# Patient Record
Sex: MALE | URBAN - METROPOLITAN AREA
[De-identification: names, ages, dates, MRNs, and addresses within clinical notes are randomized per-mention and may not be internally consistent; named-entity substitution may affect disease eponyms.]

---

## 2018-09-18 ENCOUNTER — APPOINTMENT (OUTPATIENT)
Dept: URBAN - METROPOLITAN AREA CLINIC 236 | Age: 78
Setting detail: DERMATOLOGY
End: 2018-09-18

## 2018-09-18 DIAGNOSIS — L57.0 ACTINIC KERATOSIS: ICD-10-CM

## 2018-09-18 DIAGNOSIS — L82.0 INFLAMED SEBORRHEIC KERATOSIS: ICD-10-CM

## 2018-09-18 PROCEDURE — 17003 DESTRUCT PREMALG LES 2-14: CPT

## 2018-09-18 PROCEDURE — OTHER COUNSELING: OTHER

## 2018-09-18 PROCEDURE — OTHER LIQUID NITROGEN: OTHER

## 2018-09-18 PROCEDURE — 17110 DESTRUCT B9 LESION 1-14: CPT

## 2018-09-18 PROCEDURE — 17000 DESTRUCT PREMALG LESION: CPT | Mod: 59

## 2018-09-18 ASSESSMENT — LOCATION SIMPLE DESCRIPTION DERM
LOCATION SIMPLE: LEFT SCALP
LOCATION SIMPLE: RIGHT TEMPLE
LOCATION SIMPLE: POSTERIOR SCALP
LOCATION SIMPLE: RIGHT CHEEK
LOCATION SIMPLE: RIGHT EYEBROW
LOCATION SIMPLE: LEFT FOREHEAD
LOCATION SIMPLE: LEFT ZYGOMA
LOCATION SIMPLE: RIGHT SCALP
LOCATION SIMPLE: LEFT CHEEK

## 2018-09-18 ASSESSMENT — LOCATION DETAILED DESCRIPTION DERM
LOCATION DETAILED: RIGHT SUPERIOR OCCIPITAL SCALP
LOCATION DETAILED: RIGHT CENTRAL FRONTAL SCALP
LOCATION DETAILED: LEFT SUPERIOR LATERAL MALAR CHEEK
LOCATION DETAILED: LEFT MEDIAL ZYGOMA
LOCATION DETAILED: LEFT MEDIAL MALAR CHEEK
LOCATION DETAILED: RIGHT LATERAL EYEBROW
LOCATION DETAILED: LEFT SUPERIOR FOREHEAD
LOCATION DETAILED: LEFT FOREHEAD
LOCATION DETAILED: LEFT SUPERIOR MEDIAL MALAR CHEEK
LOCATION DETAILED: LEFT CENTRAL FRONTAL SCALP
LOCATION DETAILED: RIGHT CENTRAL TEMPLE
LOCATION DETAILED: LEFT SUPERIOR POSTERIOR PARIETAL SCALP
LOCATION DETAILED: RIGHT CENTRAL MALAR CHEEK

## 2018-09-18 ASSESSMENT — LOCATION ZONE DERM
LOCATION ZONE: FACE
LOCATION ZONE: SCALP

## 2018-09-18 NOTE — PROCEDURE: LIQUID NITROGEN
Detail Level: Detailed
Consent: The patient's consent was obtained including but not limited to risks of crusting, scabbing, blistering, scarring, darker or lighter pigmentary change, recurrence, incomplete removal and infection.
Duration Of Freeze Thaw-Cycle (Seconds): 3
Render Post-Care Instructions In Note?: no
Post-Care Instructions: I reviewed with the patient in detail post-care instructions. Patient is to wear sunprotection, and avoid picking at any of the treated lesions. Pt may apply Vaseline to crusted or scabbing areas.
Number Of Freeze-Thaw Cycles: 3 freeze-thaw cycles
Medical Necessity Information: It is in your best interest to select a reason for this procedure from the list below. All of these items fulfill various CMS LCD requirements except the new and changing color options.
Duration Of Freeze Thaw-Cycle (Seconds): 0
Medical Necessity Clause: This procedure was medically necessary because the lesions that were treated were:

## 2018-10-09 ENCOUNTER — APPOINTMENT (OUTPATIENT)
Dept: URBAN - METROPOLITAN AREA CLINIC 236 | Age: 78
Setting detail: DERMATOLOGY
End: 2018-10-09

## 2018-10-09 DIAGNOSIS — L57.0 ACTINIC KERATOSIS: ICD-10-CM

## 2018-10-09 DIAGNOSIS — L82.0 INFLAMED SEBORRHEIC KERATOSIS: ICD-10-CM

## 2018-10-09 PROCEDURE — 17000 DESTRUCT PREMALG LESION: CPT | Mod: 59

## 2018-10-09 PROCEDURE — 17003 DESTRUCT PREMALG LES 2-14: CPT

## 2018-10-09 PROCEDURE — OTHER LIQUID NITROGEN (CM): OTHER

## 2018-10-09 PROCEDURE — 17110 DESTRUCT B9 LESION 1-14: CPT

## 2018-10-09 PROCEDURE — OTHER LIQUID NITROGEN: OTHER

## 2018-10-09 ASSESSMENT — LOCATION SIMPLE DESCRIPTION DERM
LOCATION SIMPLE: RIGHT FOREHEAD
LOCATION SIMPLE: LEFT ZYGOMA
LOCATION SIMPLE: POSTERIOR SCALP
LOCATION SIMPLE: LEFT TEMPLE
LOCATION SIMPLE: RIGHT THIGH
LOCATION SIMPLE: LEFT EYELID
LOCATION SIMPLE: LEFT CHEEK
LOCATION SIMPLE: LEFT FOREHEAD
LOCATION SIMPLE: RIGHT CHEEK

## 2018-10-09 ASSESSMENT — LOCATION DETAILED DESCRIPTION DERM
LOCATION DETAILED: RIGHT FOREHEAD
LOCATION DETAILED: RIGHT SUPERIOR FOREHEAD
LOCATION DETAILED: LEFT MEDIAL ZYGOMA
LOCATION DETAILED: MID-OCCIPITAL SCALP
LOCATION DETAILED: LEFT INFERIOR LATERAL MALAR CHEEK
LOCATION DETAILED: RIGHT CENTRAL MALAR CHEEK
LOCATION DETAILED: LEFT SUPERIOR FOREHEAD
LOCATION DETAILED: RIGHT ANTERIOR PROXIMAL THIGH
LOCATION DETAILED: LEFT SUPERIOR MEDIAL MALAR CHEEK
LOCATION DETAILED: RIGHT INFERIOR CENTRAL MALAR CHEEK
LOCATION DETAILED: LEFT LATERAL TEMPLE
LOCATION DETAILED: LEFT FOREHEAD
LOCATION DETAILED: LEFT MEDIAL CANTHUS

## 2018-10-09 ASSESSMENT — LOCATION ZONE DERM
LOCATION ZONE: FACE
LOCATION ZONE: LEG
LOCATION ZONE: SCALP
LOCATION ZONE: EYELID

## 2018-10-09 NOTE — PROCEDURE: LIQUID NITROGEN
X Size Of Lesion In Cm (Optional): 0
Include Z78.9 (Other Specified Conditions Influencing Health Status) As An Associated Diagnosis?: Yes
Post-Care Instructions: I reviewed with the patient in detail post-care instructions. Patient is to wear sunprotection, and avoid picking at any of the treated lesions. Pt may apply Vaseline to crusted or scabbing areas.
Render Post-Care Instructions In Note?: no
Number Of Freeze-Thaw Cycles: 3 freeze-thaw cycles
Duration Of Freeze Thaw-Cycle (Seconds): 3
Consent: The patient's consent was obtained including but not limited to risks of crusting, scabbing, blistering, scarring, darker or lighter pigmentary change, recurrence, incomplete removal and infection.
Medical Necessity Clause: This procedure was medically necessary because the lesions that were treated were:
Detail Level: Detailed
Medical Necessity Information: It is in your best interest to select a reason for this procedure from the list below. All of these items fulfill various CMS LCD requirements except the new and changing color options.

## 2018-11-06 ENCOUNTER — APPOINTMENT (OUTPATIENT)
Dept: URBAN - METROPOLITAN AREA CLINIC 236 | Age: 78
Setting detail: DERMATOLOGY
End: 2018-11-07

## 2018-11-06 DIAGNOSIS — L57.0 ACTINIC KERATOSIS: ICD-10-CM

## 2018-11-06 DIAGNOSIS — L82.0 INFLAMED SEBORRHEIC KERATOSIS: ICD-10-CM

## 2018-11-06 PROCEDURE — 17110 DESTRUCT B9 LESION 1-14: CPT | Mod: 59

## 2018-11-06 PROCEDURE — OTHER LIQUID NITROGEN: OTHER

## 2018-11-06 PROCEDURE — 17004 DESTROY PREMAL LESIONS 15/>: CPT

## 2018-11-06 ASSESSMENT — LOCATION ZONE DERM
LOCATION ZONE: NECK
LOCATION ZONE: SCALP
LOCATION ZONE: NOSE
LOCATION ZONE: FACE

## 2018-11-06 ASSESSMENT — LOCATION SIMPLE DESCRIPTION DERM
LOCATION SIMPLE: RIGHT FOREHEAD
LOCATION SIMPLE: RIGHT SCALP
LOCATION SIMPLE: RIGHT CHEEK
LOCATION SIMPLE: LEFT ZYGOMA
LOCATION SIMPLE: LEFT CHEEK
LOCATION SIMPLE: POSTERIOR SCALP
LOCATION SIMPLE: POSTERIOR NECK
LOCATION SIMPLE: NOSE
LOCATION SIMPLE: SCALP
LOCATION SIMPLE: LEFT FOREHEAD

## 2018-11-06 ASSESSMENT — LOCATION DETAILED DESCRIPTION DERM
LOCATION DETAILED: RIGHT CENTRAL MALAR CHEEK
LOCATION DETAILED: RIGHT MEDIAL FOREHEAD
LOCATION DETAILED: LEFT SUPERIOR MEDIAL FOREHEAD
LOCATION DETAILED: RIGHT MEDIAL FRONTAL SCALP
LOCATION DETAILED: MID-OCCIPITAL SCALP
LOCATION DETAILED: RIGHT LATERAL FRONTAL SCALP
LOCATION DETAILED: RIGHT LATERAL TRAPEZIAL NECK
LOCATION DETAILED: LEFT FOREHEAD
LOCATION DETAILED: LEFT CENTRAL ZYGOMA
LOCATION DETAILED: LEFT INFERIOR FOREHEAD
LOCATION DETAILED: RIGHT SUPERIOR PARIETAL SCALP
LOCATION DETAILED: LEFT CENTRAL PARIETAL SCALP
LOCATION DETAILED: NASAL ROOT
LOCATION DETAILED: LEFT SUPERIOR CENTRAL MALAR CHEEK
LOCATION DETAILED: LEFT LATERAL ZYGOMA

## 2018-11-06 NOTE — PROCEDURE: LIQUID NITROGEN
Consent: The patient's consent was obtained including but not limited to risks of crusting, scabbing, blistering, scarring, darker or lighter pigmentary change, recurrence, incomplete removal and infection.
Add 52 Modifier (Optional): no
Number Of Freeze-Thaw Cycles: 3 freeze-thaw cycles
Post-Care Instructions: I reviewed with the patient in detail post-care instructions. Patient is to wear sunprotection, and avoid picking at any of the treated lesions. Pt may apply Vaseline to crusted or scabbing areas.
Medical Necessity Information: It is in your best interest to select a reason for this procedure from the list below. All of these items fulfill various CMS LCD requirements except the new and changing color options.
Detail Level: Simple
Include Z78.9 (Other Specified Conditions Influencing Health Status) As An Associated Diagnosis?: Yes
Duration Of Freeze Thaw-Cycle (Seconds): 3
Medical Necessity Clause: This procedure was medically necessary because the lesions that were treated were:

## 2018-12-04 ENCOUNTER — APPOINTMENT (OUTPATIENT)
Dept: URBAN - METROPOLITAN AREA CLINIC 236 | Age: 78
Setting detail: DERMATOLOGY
End: 2018-12-06

## 2018-12-04 DIAGNOSIS — L57.0 ACTINIC KERATOSIS: ICD-10-CM

## 2018-12-04 DIAGNOSIS — L82.0 INFLAMED SEBORRHEIC KERATOSIS: ICD-10-CM

## 2018-12-04 PROCEDURE — 17004 DESTROY PREMAL LESIONS 15/>: CPT

## 2018-12-04 PROCEDURE — OTHER LIQUID NITROGEN: OTHER

## 2018-12-04 PROCEDURE — OTHER COUNSELING: OTHER

## 2018-12-04 PROCEDURE — 99212 OFFICE O/P EST SF 10 MIN: CPT | Mod: 25

## 2018-12-04 ASSESSMENT — LOCATION DETAILED DESCRIPTION DERM
LOCATION DETAILED: LEFT FOREHEAD
LOCATION DETAILED: LEFT SUPERIOR CENTRAL MALAR CHEEK
LOCATION DETAILED: RIGHT INFERIOR CENTRAL MALAR CHEEK
LOCATION DETAILED: LEFT CENTRAL MALAR CHEEK
LOCATION DETAILED: RIGHT LATERAL FOREHEAD
LOCATION DETAILED: LEFT SUPERIOR MEDIAL MALAR CHEEK
LOCATION DETAILED: LEFT INFERIOR POSTAURICULAR SKIN
LOCATION DETAILED: RIGHT INFERIOR PREAURICULAR CHEEK
LOCATION DETAILED: RIGHT LATERAL TRAPEZIAL NECK
LOCATION DETAILED: RIGHT CENTRAL FRONTAL SCALP
LOCATION DETAILED: LEFT SUPERIOR OCCIPITAL SCALP
LOCATION DETAILED: LEFT SUPERIOR PREAURICULAR CHEEK
LOCATION DETAILED: POSTERIOR MID-PARIETAL SCALP
LOCATION DETAILED: RIGHT INFERIOR LATERAL FOREHEAD
LOCATION DETAILED: RIGHT SUPERIOR OCCIPITAL SCALP

## 2018-12-04 ASSESSMENT — LOCATION SIMPLE DESCRIPTION DERM
LOCATION SIMPLE: LEFT FOREHEAD
LOCATION SIMPLE: RIGHT SCALP
LOCATION SIMPLE: RIGHT FOREHEAD
LOCATION SIMPLE: LEFT CHEEK
LOCATION SIMPLE: POSTERIOR SCALP
LOCATION SIMPLE: RIGHT CHEEK
LOCATION SIMPLE: POSTERIOR NECK

## 2018-12-04 ASSESSMENT — LOCATION ZONE DERM
LOCATION ZONE: SCALP
LOCATION ZONE: FACE
LOCATION ZONE: NECK

## 2018-12-04 NOTE — PROCEDURE: LIQUID NITROGEN
Number Of Freeze-Thaw Cycles: 3 freeze-thaw cycles
Render Post-Care Instructions In Note?: no
Post-Care Instructions: I reviewed with the patient in detail post-care instructions. Patient is to wear sunprotection, and avoid picking at any of the treated lesions. Pt may apply Vaseline to crusted or scabbing areas.
Detail Level: Detailed
Duration Of Freeze Thaw-Cycle (Seconds): 3
Consent: The patient's consent was obtained including but not limited to risks of crusting, scabbing, blistering, scarring, darker or lighter pigmentary change, recurrence, incomplete removal and infection.

## 2019-01-02 ENCOUNTER — APPOINTMENT (OUTPATIENT)
Dept: URBAN - METROPOLITAN AREA CLINIC 236 | Age: 79
Setting detail: DERMATOLOGY
End: 2019-01-02

## 2019-01-02 DIAGNOSIS — L57.0 ACTINIC KERATOSIS: ICD-10-CM

## 2019-01-02 PROCEDURE — OTHER LIQUID NITROGEN: OTHER

## 2019-01-02 PROCEDURE — 17003 DESTRUCT PREMALG LES 2-14: CPT

## 2019-01-02 PROCEDURE — 17000 DESTRUCT PREMALG LESION: CPT

## 2019-01-02 ASSESSMENT — LOCATION ZONE DERM
LOCATION ZONE: FACE
LOCATION ZONE: NOSE
LOCATION ZONE: SCALP
LOCATION ZONE: EAR

## 2019-01-02 ASSESSMENT — LOCATION DETAILED DESCRIPTION DERM
LOCATION DETAILED: RIGHT SUPERIOR FOREHEAD
LOCATION DETAILED: RIGHT INFERIOR CENTRAL MALAR CHEEK
LOCATION DETAILED: LEFT SUPERIOR CRUS OF ANTIHELIX
LOCATION DETAILED: LEFT CENTRAL MALAR CHEEK
LOCATION DETAILED: LEFT NASAL SIDEWALL
LOCATION DETAILED: LEFT SUPERIOR CENTRAL MALAR CHEEK
LOCATION DETAILED: LEFT CENTRAL FRONTAL SCALP

## 2019-01-02 ASSESSMENT — LOCATION SIMPLE DESCRIPTION DERM
LOCATION SIMPLE: LEFT CHEEK
LOCATION SIMPLE: LEFT EAR
LOCATION SIMPLE: LEFT SCALP
LOCATION SIMPLE: RIGHT CHEEK
LOCATION SIMPLE: RIGHT FOREHEAD
LOCATION SIMPLE: LEFT NOSE

## 2019-01-02 NOTE — PROCEDURE: LIQUID NITROGEN
Detail Level: Detailed
Duration Of Freeze Thaw-Cycle (Seconds): 3
Consent: The patient's consent was obtained including but not limited to risks of crusting, scabbing, blistering, scarring, darker or lighter pigmentary change, recurrence, incomplete removal and infection.
Render Post-Care Instructions In Note?: no
Number Of Freeze-Thaw Cycles: 3 freeze-thaw cycles
Post-Care Instructions: I reviewed with the patient in detail post-care instructions. Patient is to wear sunprotection, and avoid picking at any of the treated lesions. Pt may apply Vaseline to crusted or scabbing areas.

## 2019-01-30 ENCOUNTER — APPOINTMENT (OUTPATIENT)
Dept: URBAN - METROPOLITAN AREA CLINIC 236 | Age: 79
Setting detail: DERMATOLOGY
End: 2019-01-30

## 2019-01-30 DIAGNOSIS — D49.2 NEOPLASM OF UNSPECIFIED BEHAVIOR OF BONE, SOFT TISSUE, AND SKIN: ICD-10-CM

## 2019-01-30 DIAGNOSIS — L57.0 ACTINIC KERATOSIS: ICD-10-CM

## 2019-01-30 PROCEDURE — OTHER SHAVE REMOVAL: OTHER

## 2019-01-30 PROCEDURE — 17000 DESTRUCT PREMALG LESION: CPT | Mod: 59

## 2019-01-30 PROCEDURE — 17003 DESTRUCT PREMALG LES 2-14: CPT

## 2019-01-30 PROCEDURE — 11102 TANGNTL BX SKIN SINGLE LES: CPT | Mod: 59

## 2019-01-30 PROCEDURE — OTHER LIQUID NITROGEN: OTHER

## 2019-01-30 PROCEDURE — 11310 SHAVE SKIN LESION 0.5 CM/<: CPT | Mod: 59

## 2019-01-30 PROCEDURE — OTHER BIOPSY BY SHAVE METHOD: OTHER

## 2019-01-30 ASSESSMENT — LOCATION DETAILED DESCRIPTION DERM
LOCATION DETAILED: LEFT FOREHEAD
LOCATION DETAILED: RIGHT SUPERIOR OCCIPITAL SCALP
LOCATION DETAILED: LEFT CENTRAL MALAR CHEEK
LOCATION DETAILED: LEFT NASAL SIDEWALL
LOCATION DETAILED: MID-OCCIPITAL SCALP
LOCATION DETAILED: RIGHT INFERIOR CENTRAL MALAR CHEEK
LOCATION DETAILED: LEFT SUPERIOR OCCIPITAL SCALP
LOCATION DETAILED: LEFT CENTRAL FRONTAL SCALP
LOCATION DETAILED: RIGHT SUPERIOR FOREHEAD
LOCATION DETAILED: LEFT SUPERIOR MEDIAL MALAR CHEEK
LOCATION DETAILED: LEFT SUPERIOR CENTRAL MALAR CHEEK

## 2019-01-30 ASSESSMENT — LOCATION SIMPLE DESCRIPTION DERM
LOCATION SIMPLE: LEFT FOREHEAD
LOCATION SIMPLE: LEFT NOSE
LOCATION SIMPLE: LEFT SCALP
LOCATION SIMPLE: POSTERIOR SCALP
LOCATION SIMPLE: RIGHT CHEEK
LOCATION SIMPLE: LEFT CHEEK
LOCATION SIMPLE: RIGHT FOREHEAD

## 2019-01-30 ASSESSMENT — LOCATION ZONE DERM
LOCATION ZONE: NOSE
LOCATION ZONE: FACE
LOCATION ZONE: SCALP

## 2019-01-30 NOTE — PROCEDURE: SHAVE REMOVAL
Was A Bandage Applied: Yes
Detail Level: Detailed
Wound Care: Vaseline
Bill 68535 For Specimen Handling/Conveyance To Laboratory?: no
Notification Instructions: Patient will be notified of biopsy results. However, patient instructed to call the office if not contacted within 2 weeks.
Medical Necessity Clause: This procedure was medically necessary because the lesion that was treated was:
Size Of Lesion In Cm (Required): 0.2
X Size Of Lesion In Cm (Optional): 0
Biopsy Method: Dermablade
Hemostasis: Electrocautery and TCA 25%
Post-Care Instructions: I reviewed with the patient in detail post-care instructions. Patient is to keep the biopsy site dry overnight, and then apply bacitracin twice daily until healed. Patient may apply hydrogen peroxide soaks to remove any crusting.
Medical Necessity Information: It is in your best interest to select a reason for this procedure from the list below. All of these items fulfill various CMS LCD requirements except the new and changing color options.
Path Notes (To The Dermatopathologist): Please check margins.
Billing Type: Third-Party Bill
Anesthesia Type: 1% lidocaine with epinephrine
Consent was obtained from the patient. The risks and benefits to therapy were discussed in detail. Specifically, the risks of infection, scarring, bleeding, prolonged wound healing, incomplete removal, allergy to anesthesia, nerve injury and recurrence were addressed. Prior to the procedure, the treatment site was clearly identified and confirmed by the patient. All components of Universal Protocol/PAUSE Rule completed.

## 2019-01-30 NOTE — PROCEDURE: BIOPSY BY SHAVE METHOD
X Size Of Lesion In Cm: 0
Post-Care Instructions: I reviewed with the patient in detail post-care instructions. Patient is to keep the biopsy site dry overnight, and then apply bacitracin twice daily until healed. Patient may apply hydrogen peroxide soaks to remove any crusting.
Destruction After The Procedure: No
Electrodesiccation Text: The wound bed was treated with electrodesiccation after the biopsy was performed.
Anesthesia Type: 1% lidocaine with epinephrine
Notification Instructions: Patient will be notified of biopsy results. However, patient instructed to call the office if not contacted within 2 weeks.
Consent: Written consent was obtained and risks were reviewed including but not limited to scarring, infection, bleeding, scabbing, incomplete removal, nerve damage and allergy to anesthesia.
Size Of Lesion In Cm: 0.7
Electrodesiccation And Curettage Text: The wound bed was treated with electrodesiccation and curettage after the biopsy was performed.
Depth Of Biopsy: dermis
Biopsy Method: Dermablade
Type Of Destruction Used: Curettage
Billing Type: Third-Party Bill
Anesthesia Volume In Cc (Will Not Render If 0): 0.5
Was A Bandage Applied: Yes
Cryotherapy Text: The wound bed was treated with cryotherapy after the biopsy was performed.
Detail Level: Detailed
Silver Nitrate Text: The wound bed was treated with silver nitrate after the biopsy was performed.
Wound Care: Bacitracin
Biopsy Type: H and E
Hemostasis: Electrocautery and TCA 25%
Dressing: bandage
Curettage Text: The wound bed was treated with curettage after the biopsy was performed.

## 2019-02-27 ENCOUNTER — APPOINTMENT (OUTPATIENT)
Dept: URBAN - METROPOLITAN AREA CLINIC 236 | Age: 79
Setting detail: DERMATOLOGY
End: 2019-02-27

## 2019-02-27 DIAGNOSIS — L57.0 ACTINIC KERATOSIS: ICD-10-CM

## 2019-02-27 PROBLEM — C44.329 SQUAMOUS CELL CARCINOMA OF SKIN OF OTHER PARTS OF FACE: Status: ACTIVE | Noted: 2019-02-27

## 2019-02-27 PROCEDURE — 17004 DESTROY PREMAL LESIONS 15/>: CPT

## 2019-02-27 PROCEDURE — OTHER DEFER: OTHER

## 2019-02-27 PROCEDURE — 99212 OFFICE O/P EST SF 10 MIN: CPT | Mod: 25

## 2019-02-27 PROCEDURE — OTHER LIQUID NITROGEN: OTHER

## 2019-02-27 PROCEDURE — OTHER PATHOLOGY DISCUSSION: OTHER

## 2019-02-27 ASSESSMENT — LOCATION DETAILED DESCRIPTION DERM
LOCATION DETAILED: MID-OCCIPITAL SCALP
LOCATION DETAILED: LEFT INFERIOR CENTRAL MALAR CHEEK
LOCATION DETAILED: RIGHT INFERIOR FOREHEAD
LOCATION DETAILED: RIGHT MEDIAL ZYGOMA
LOCATION DETAILED: RIGHT MID PREAURICULAR CHEEK
LOCATION DETAILED: LEFT SUPERIOR CENTRAL MALAR CHEEK
LOCATION DETAILED: LEFT SUPERIOR PREAURICULAR CHEEK
LOCATION DETAILED: LEFT INFERIOR LATERAL MALAR CHEEK
LOCATION DETAILED: LEFT INFERIOR MEDIAL FOREHEAD
LOCATION DETAILED: LEFT FOREHEAD
LOCATION DETAILED: LEFT INFERIOR FOREHEAD
LOCATION DETAILED: LEFT SUPERIOR LATERAL MALAR CHEEK
LOCATION DETAILED: RIGHT SUPERIOR FOREHEAD
LOCATION DETAILED: RIGHT SUPERIOR OCCIPITAL SCALP
LOCATION DETAILED: LEFT SUPERIOR MEDIAL FOREHEAD

## 2019-02-27 ASSESSMENT — LOCATION SIMPLE DESCRIPTION DERM
LOCATION SIMPLE: LEFT FOREHEAD
LOCATION SIMPLE: RIGHT CHEEK
LOCATION SIMPLE: POSTERIOR SCALP
LOCATION SIMPLE: LEFT CHEEK
LOCATION SIMPLE: RIGHT FOREHEAD
LOCATION SIMPLE: RIGHT ZYGOMA

## 2019-02-27 ASSESSMENT — LOCATION ZONE DERM
LOCATION ZONE: FACE
LOCATION ZONE: SCALP

## 2019-02-27 NOTE — PROCEDURE: LIQUID NITROGEN
Post-Care Instructions: I reviewed with the patient in detail post-care instructions. Patient is to wear sunprotection, and avoid picking at any of the treated lesions. Pt may apply Vaseline to crusted or scabbing areas.
Number Of Freeze-Thaw Cycles: 3 freeze-thaw cycles
Detail Level: Detailed
Consent: The patient's consent was obtained including but not limited to risks of crusting, scabbing, blistering, scarring, darker or lighter pigmentary change, recurrence, incomplete removal and infection.
Duration Of Freeze Thaw-Cycle (Seconds): 3
Render Post-Care Instructions In Note?: no

## 2019-02-27 NOTE — PROCEDURE: DEFER
Introduction Text (Please End With A Colon): The following procedure was deferred: Dr. Ge
Procedure To Be Performed At Next Visit: Mohs surgery
Detail Level: Detailed

## 2019-03-27 ENCOUNTER — APPOINTMENT (OUTPATIENT)
Dept: URBAN - METROPOLITAN AREA CLINIC 236 | Age: 79
Setting detail: DERMATOLOGY
End: 2019-03-27

## 2019-03-27 DIAGNOSIS — L57.0 ACTINIC KERATOSIS: ICD-10-CM

## 2019-03-27 DIAGNOSIS — Z85.828 PERSONAL HISTORY OF OTHER MALIGNANT NEOPLASM OF SKIN: ICD-10-CM

## 2019-03-27 PROCEDURE — 17003 DESTRUCT PREMALG LES 2-14: CPT

## 2019-03-27 PROCEDURE — 17000 DESTRUCT PREMALG LESION: CPT

## 2019-03-27 PROCEDURE — 99212 OFFICE O/P EST SF 10 MIN: CPT | Mod: 25

## 2019-03-27 PROCEDURE — OTHER DIAGNOSIS COMMENT: OTHER

## 2019-03-27 PROCEDURE — OTHER POST-OP WOUND EVALUATION: OTHER

## 2019-03-27 PROCEDURE — OTHER LIQUID NITROGEN: OTHER

## 2019-03-27 ASSESSMENT — LOCATION DETAILED DESCRIPTION DERM
LOCATION DETAILED: LEFT SUPERIOR CENTRAL MALAR CHEEK
LOCATION DETAILED: LEFT SUPERIOR MEDIAL MALAR CHEEK
LOCATION DETAILED: RIGHT SUPERIOR OCCIPITAL SCALP
LOCATION DETAILED: RIGHT CENTRAL MALAR CHEEK
LOCATION DETAILED: LEFT INFERIOR LATERAL FOREHEAD
LOCATION DETAILED: RIGHT INFERIOR FOREHEAD
LOCATION DETAILED: MID-OCCIPITAL SCALP
LOCATION DETAILED: RIGHT MEDIAL ZYGOMA
LOCATION DETAILED: LEFT SUPERIOR PREAURICULAR CHEEK
LOCATION DETAILED: LEFT SUPERIOR OCCIPITAL SCALP

## 2019-03-27 ASSESSMENT — LOCATION ZONE DERM
LOCATION ZONE: SCALP
LOCATION ZONE: FACE

## 2019-03-27 ASSESSMENT — LOCATION SIMPLE DESCRIPTION DERM
LOCATION SIMPLE: RIGHT ZYGOMA
LOCATION SIMPLE: POSTERIOR SCALP
LOCATION SIMPLE: LEFT FOREHEAD
LOCATION SIMPLE: RIGHT FOREHEAD
LOCATION SIMPLE: LEFT CHEEK
LOCATION SIMPLE: RIGHT CHEEK

## 2019-03-27 NOTE — PROCEDURE: LIQUID NITROGEN
Detail Level: Detailed
Number Of Freeze-Thaw Cycles: 3 freeze-thaw cycles
Duration Of Freeze Thaw-Cycle (Seconds): 3
Consent: The patient's consent was obtained including but not limited to risks of crusting, scabbing, blistering, scarring, darker or lighter pigmentary change, recurrence, incomplete removal and infection.
Render Post-Care Instructions In Note?: no
Post-Care Instructions: I reviewed with the patient in detail post-care instructions. Patient is to wear sunprotection, and avoid picking at any of the treated lesions. Pt may apply Vaseline to crusted or scabbing areas.

## 2019-04-25 ENCOUNTER — APPOINTMENT (OUTPATIENT)
Dept: URBAN - METROPOLITAN AREA CLINIC 236 | Age: 79
Setting detail: DERMATOLOGY
End: 2019-04-25

## 2019-04-25 DIAGNOSIS — L57.0 ACTINIC KERATOSIS: ICD-10-CM

## 2019-04-25 PROCEDURE — OTHER LIQUID NITROGEN: OTHER

## 2019-04-25 PROCEDURE — OTHER ADDITIONAL NOTES: OTHER

## 2019-04-25 PROCEDURE — 17004 DESTROY PREMAL LESIONS 15/>: CPT

## 2019-04-25 ASSESSMENT — LOCATION DETAILED DESCRIPTION DERM
LOCATION DETAILED: RIGHT SUPERIOR FOREHEAD
LOCATION DETAILED: RIGHT LATERAL FOREHEAD
LOCATION DETAILED: LEFT POSTERIOR PARIETAL SCALP
LOCATION DETAILED: LEFT SUPERIOR MEDIAL FOREHEAD
LOCATION DETAILED: RIGHT RADIAL DORSAL HAND
LOCATION DETAILED: LEFT SUPERIOR OCCIPITAL SCALP
LOCATION DETAILED: LEFT MEDIAL FRONTAL SCALP
LOCATION DETAILED: LEFT SUPERIOR PREAURICULAR CHEEK
LOCATION DETAILED: MID-OCCIPITAL SCALP
LOCATION DETAILED: LEFT SUPERIOR CENTRAL MALAR CHEEK
LOCATION DETAILED: RIGHT MEDIAL FRONTAL SCALP
LOCATION DETAILED: RIGHT CENTRAL ZYGOMA
LOCATION DETAILED: RIGHT SUPERIOR OCCIPITAL SCALP
LOCATION DETAILED: LEFT INFERIOR CRUS OF ANTIHELIX
LOCATION DETAILED: LEFT CENTRAL ZYGOMA
LOCATION DETAILED: LEFT INFERIOR CENTRAL MALAR CHEEK
LOCATION DETAILED: LEFT FOREHEAD

## 2019-04-25 ASSESSMENT — LOCATION SIMPLE DESCRIPTION DERM
LOCATION SIMPLE: RIGHT OCCIPITAL SCALP
LOCATION SIMPLE: LEFT FOREHEAD
LOCATION SIMPLE: RIGHT HAND
LOCATION SIMPLE: LEFT SCALP
LOCATION SIMPLE: POSTERIOR SCALP
LOCATION SIMPLE: LEFT OCCIPITAL SCALP
LOCATION SIMPLE: RIGHT SCALP
LOCATION SIMPLE: LEFT EAR
LOCATION SIMPLE: RIGHT ZYGOMA
LOCATION SIMPLE: LEFT ZYGOMA
LOCATION SIMPLE: LEFT CHEEK
LOCATION SIMPLE: RIGHT FOREHEAD

## 2019-04-25 ASSESSMENT — LOCATION ZONE DERM
LOCATION ZONE: EAR
LOCATION ZONE: SCALP
LOCATION ZONE: FACE
LOCATION ZONE: HAND

## 2019-04-25 NOTE — PROCEDURE: LIQUID NITROGEN
Number Of Freeze-Thaw Cycles: 3 freeze-thaw cycles
Post-Care Instructions: I reviewed with the patient in detail post-care instructions. Patient is to wear sunprotection, and avoid picking at any of the treated lesions. Pt may apply Vaseline to crusted or scabbing areas.
Render Note In Bullet Format When Appropriate: No
Duration Of Freeze Thaw-Cycle (Seconds): 3
Consent: The patient's consent was obtained including but not limited to risks of crusting, scabbing, blistering, scarring, darker or lighter pigmentary change, recurrence, incomplete removal and infection.
Detail Level: Detailed

## 2019-04-25 NOTE — PROCEDURE: ADDITIONAL NOTES
Additional Notes: The AK That has been treated under the eye will be bx at next visit if not reactive to ln2
Detail Level: Simple

## 2019-05-23 ENCOUNTER — APPOINTMENT (OUTPATIENT)
Dept: URBAN - METROPOLITAN AREA CLINIC 236 | Age: 79
Setting detail: DERMATOLOGY
End: 2019-05-23

## 2019-05-23 DIAGNOSIS — L57.0 ACTINIC KERATOSIS: ICD-10-CM

## 2019-05-23 PROCEDURE — 17004 DESTROY PREMAL LESIONS 15/>: CPT

## 2019-05-23 PROCEDURE — OTHER LIQUID NITROGEN: OTHER

## 2019-05-23 ASSESSMENT — LOCATION DETAILED DESCRIPTION DERM
LOCATION DETAILED: RIGHT SUPERIOR OCCIPITAL SCALP
LOCATION DETAILED: RIGHT CENTRAL FRONTAL SCALP
LOCATION DETAILED: GLABELLA
LOCATION DETAILED: RIGHT SUPERIOR FRONTAL SCALP
LOCATION DETAILED: RIGHT INFERIOR LATERAL FOREHEAD
LOCATION DETAILED: LEFT LATERAL FOREHEAD
LOCATION DETAILED: LEFT MEDIAL ZYGOMA
LOCATION DETAILED: RIGHT MID PREAURICULAR CHEEK
LOCATION DETAILED: NASAL DORSUM
LOCATION DETAILED: RIGHT CENTRAL MALAR CHEEK
LOCATION DETAILED: LEFT FOREHEAD
LOCATION DETAILED: LEFT SUPERIOR FOREHEAD
LOCATION DETAILED: RIGHT SUPERIOR CENTRAL MALAR CHEEK
LOCATION DETAILED: MID-OCCIPITAL SCALP
LOCATION DETAILED: LEFT SUPERIOR CENTRAL MALAR CHEEK
LOCATION DETAILED: LEFT INFERIOR FOREHEAD
LOCATION DETAILED: LEFT ANTIHELIX
LOCATION DETAILED: LEFT INFERIOR CENTRAL MALAR CHEEK

## 2019-05-23 ASSESSMENT — LOCATION SIMPLE DESCRIPTION DERM
LOCATION SIMPLE: RIGHT SCALP
LOCATION SIMPLE: GLABELLA
LOCATION SIMPLE: NOSE
LOCATION SIMPLE: RIGHT FOREHEAD
LOCATION SIMPLE: LEFT FOREHEAD
LOCATION SIMPLE: POSTERIOR SCALP
LOCATION SIMPLE: LEFT ZYGOMA
LOCATION SIMPLE: SCALP
LOCATION SIMPLE: LEFT CHEEK
LOCATION SIMPLE: LEFT EAR
LOCATION SIMPLE: RIGHT CHEEK

## 2019-05-23 ASSESSMENT — LOCATION ZONE DERM
LOCATION ZONE: EAR
LOCATION ZONE: SCALP
LOCATION ZONE: NOSE
LOCATION ZONE: FACE

## 2019-05-23 NOTE — PROCEDURE: LIQUID NITROGEN
Render Note In Bullet Format When Appropriate: No
Detail Level: Detailed
Duration Of Freeze Thaw-Cycle (Seconds): 3
Post-Care Instructions: I reviewed with the patient in detail post-care instructions. Patient is to wear sunprotection, and avoid picking at any of the treated lesions. Pt may apply Vaseline to crusted or scabbing areas.
Consent: The patient's consent was obtained including but not limited to risks of crusting, scabbing, blistering, scarring, darker or lighter pigmentary change, recurrence, incomplete removal and infection.
Number Of Freeze-Thaw Cycles: 3 freeze-thaw cycles

## 2019-06-20 ENCOUNTER — APPOINTMENT (OUTPATIENT)
Dept: URBAN - METROPOLITAN AREA CLINIC 236 | Age: 79
Setting detail: DERMATOLOGY
End: 2019-06-21

## 2019-06-20 DIAGNOSIS — L57.0 ACTINIC KERATOSIS: ICD-10-CM

## 2019-06-20 DIAGNOSIS — L89 PRESSURE ULCER: ICD-10-CM

## 2019-06-20 PROBLEM — L89.152 PRESSURE ULCER OF SACRAL REGION, STAGE 2: Status: ACTIVE | Noted: 2019-06-20

## 2019-06-20 PROCEDURE — OTHER PRESCRIPTION: OTHER

## 2019-06-20 PROCEDURE — 17000 DESTRUCT PREMALG LESION: CPT

## 2019-06-20 PROCEDURE — OTHER LIQUID NITROGEN: OTHER

## 2019-06-20 PROCEDURE — OTHER ADDITIONAL NOTES: OTHER

## 2019-06-20 PROCEDURE — 17003 DESTRUCT PREMALG LES 2-14: CPT

## 2019-06-20 PROCEDURE — OTHER ORDER TESTS: OTHER

## 2019-06-20 PROCEDURE — OTHER COUNSELING: OTHER

## 2019-06-20 PROCEDURE — 99213 OFFICE O/P EST LOW 20 MIN: CPT | Mod: 25

## 2019-06-20 RX ORDER — MUPIROCIN 20 MG/G
OINTMENT TOPICAL
Qty: 1 | Refills: 0 | Status: ERX | COMMUNITY
Start: 2019-06-20

## 2019-06-20 ASSESSMENT — LOCATION SIMPLE DESCRIPTION DERM
LOCATION SIMPLE: GLUTEAL CLEFT
LOCATION SIMPLE: RIGHT FOREHEAD
LOCATION SIMPLE: RIGHT SCALP
LOCATION SIMPLE: LEFT EYEBROW
LOCATION SIMPLE: LEFT FOREHEAD
LOCATION SIMPLE: LEFT CHEEK
LOCATION SIMPLE: SCALP

## 2019-06-20 ASSESSMENT — LOCATION DETAILED DESCRIPTION DERM
LOCATION DETAILED: RIGHT CENTRAL FRONTAL SCALP
LOCATION DETAILED: LEFT LATERAL EYEBROW
LOCATION DETAILED: LEFT SUPERIOR LATERAL MALAR CHEEK
LOCATION DETAILED: GLUTEAL CLEFT
LOCATION DETAILED: RIGHT FOREHEAD
LOCATION DETAILED: LEFT SUPERIOR FOREHEAD
LOCATION DETAILED: LEFT MEDIAL EYEBROW
LOCATION DETAILED: RIGHT SUPERIOR FOREHEAD
LOCATION DETAILED: LEFT SUPERIOR MEDIAL MALAR CHEEK
LOCATION DETAILED: LEFT INFERIOR POSTAURICULAR SKIN

## 2019-06-20 ASSESSMENT — LOCATION ZONE DERM
LOCATION ZONE: SCALP
LOCATION ZONE: TRUNK
LOCATION ZONE: FACE

## 2019-06-20 NOTE — PROCEDURE: ADDITIONAL NOTES
Additional Notes: Instructs pt to get a “donut” to sit on to help relieve pressure to area.
Detail Level: Simple

## 2019-06-20 NOTE — PROCEDURE: LIQUID NITROGEN
Post-Care Instructions: I reviewed with the patient in detail post-care instructions. Patient is to wear sunprotection, and avoid picking at any of the treated lesions. Pt may apply Vaseline to crusted or scabbing areas.
Number Of Freeze-Thaw Cycles: 3 freeze-thaw cycles
Consent: The patient's consent was obtained including but not limited to risks of crusting, scabbing, blistering, scarring, darker or lighter pigmentary change, recurrence, incomplete removal and infection.
Render Post-Care Instructions In Note?: no
Duration Of Freeze Thaw-Cycle (Seconds): 3
Detail Level: Detailed

## 2019-06-27 ENCOUNTER — APPOINTMENT (OUTPATIENT)
Dept: URBAN - METROPOLITAN AREA CLINIC 236 | Age: 79
Setting detail: DERMATOLOGY
End: 2019-06-27

## 2019-06-27 DIAGNOSIS — L89 PRESSURE ULCER: ICD-10-CM

## 2019-06-27 PROBLEM — L89.151 PRESSURE ULCER OF SACRAL REGION, STAGE 1: Status: ACTIVE | Noted: 2019-06-27

## 2019-06-27 PROCEDURE — OTHER PRESCRIPTION: OTHER

## 2019-06-27 PROCEDURE — 99213 OFFICE O/P EST LOW 20 MIN: CPT | Mod: 24

## 2019-06-27 PROCEDURE — OTHER COUNSELING: OTHER

## 2019-06-27 PROCEDURE — OTHER ADDITIONAL NOTES: OTHER

## 2019-06-27 RX ORDER — MUPIROCIN 20 MG/G
OINTMENT TOPICAL
Qty: 1 | Refills: 0 | Status: ERX

## 2019-06-27 ASSESSMENT — LOCATION ZONE DERM: LOCATION ZONE: TRUNK

## 2019-06-27 ASSESSMENT — LOCATION DETAILED DESCRIPTION DERM: LOCATION DETAILED: GLUTEAL CLEFT

## 2019-06-27 ASSESSMENT — LOCATION SIMPLE DESCRIPTION DERM: LOCATION SIMPLE: GLUTEAL CLEFT

## 2019-06-27 NOTE — PROCEDURE: ADDITIONAL NOTES
Detail Level: Simple
Additional Notes: Instructs pt to continue to use a “donut” to sit on to help relieve pressure to area.

## 2019-07-09 ENCOUNTER — APPOINTMENT (OUTPATIENT)
Dept: URBAN - METROPOLITAN AREA CLINIC 236 | Age: 79
Setting detail: DERMATOLOGY
End: 2019-07-10

## 2019-07-09 DIAGNOSIS — D49.2 NEOPLASM OF UNSPECIFIED BEHAVIOR OF BONE, SOFT TISSUE, AND SKIN: ICD-10-CM

## 2019-07-09 DIAGNOSIS — L89 PRESSURE ULCER: ICD-10-CM

## 2019-07-09 PROBLEM — L89.151 PRESSURE ULCER OF SACRAL REGION, STAGE 1: Status: ACTIVE | Noted: 2019-07-09

## 2019-07-09 PROCEDURE — OTHER ADDITIONAL NOTES: OTHER

## 2019-07-09 PROCEDURE — OTHER COUNSELING: OTHER

## 2019-07-09 PROCEDURE — 99213 OFFICE O/P EST LOW 20 MIN: CPT | Mod: 25

## 2019-07-09 PROCEDURE — A4550 SURGICAL TRAYS: HCPCS

## 2019-07-09 PROCEDURE — OTHER BIOPSY BY SHAVE METHOD: OTHER

## 2019-07-09 PROCEDURE — 11102 TANGNTL BX SKIN SINGLE LES: CPT

## 2019-07-09 ASSESSMENT — LOCATION SIMPLE DESCRIPTION DERM
LOCATION SIMPLE: NOSE
LOCATION SIMPLE: GLUTEAL CLEFT

## 2019-07-09 ASSESSMENT — LOCATION DETAILED DESCRIPTION DERM
LOCATION DETAILED: LEFT NASAL ROOT
LOCATION DETAILED: GLUTEAL CLEFT

## 2019-07-09 ASSESSMENT — LOCATION ZONE DERM
LOCATION ZONE: NOSE
LOCATION ZONE: TRUNK

## 2019-07-09 NOTE — PROCEDURE: BIOPSY BY SHAVE METHOD
Destruction After The Procedure: No
Type Of Destruction Used: Curettage
Anesthesia Type: 1% lidocaine with epinephrine
Path Notes (To The Dermatopathologist): Please check margins
Notification Instructions: Patient will be notified of biopsy results. However, patient instructed to call the office if not contacted within 2 weeks.
Silver Nitrate Text: The wound bed was treated with silver nitrate after the biopsy was performed.
Post-Care Instructions: I reviewed with the patient in detail post-care instructions. Patient is to keep the biopsy site dry overnight, and then apply bacitracin twice daily until healed. Patient may apply hydrogen peroxide soaks to remove any crusting.
Dressing: bandage
Biopsy Type: H and E
Additional Anesthesia Volume In Cc (Will Not Render If 0): 0
Curettage Text: The wound bed was treated with curettage after the biopsy was performed.
Electrodesiccation Text: The wound bed was treated with electrodesiccation after the biopsy was performed.
Bill For Surgical Tray: yes
Detail Level: Detailed
Anesthesia Volume In Cc (Will Not Render If 0): 0.5
Hemostasis: Electrocautery and TCA 25%
Cryotherapy Text: The wound bed was treated with cryotherapy after the biopsy was performed.
Depth Of Biopsy: dermis
Consent: Written consent was obtained and risks were reviewed including but not limited to scarring, infection, bleeding, scabbing, incomplete removal, nerve damage and allergy to anesthesia.
Biopsy Method: Dermablade
Billing Type: Third-Party Bill
Electrodesiccation And Curettage Text: The wound bed was treated with electrodesiccation and curettage after the biopsy was performed.
Wound Care: Bacitracin

## 2019-07-30 ENCOUNTER — APPOINTMENT (OUTPATIENT)
Dept: URBAN - METROPOLITAN AREA CLINIC 236 | Age: 79
Setting detail: DERMATOLOGY
End: 2019-07-31

## 2019-07-30 DIAGNOSIS — L57.0 ACTINIC KERATOSIS: ICD-10-CM

## 2019-07-30 PROBLEM — D04.39 CARCINOMA IN SITU OF SKIN OF OTHER PARTS OF FACE: Status: ACTIVE | Noted: 2019-07-30

## 2019-07-30 PROCEDURE — OTHER DEFER: OTHER

## 2019-07-30 PROCEDURE — OTHER PATHOLOGY DISCUSSION: OTHER

## 2019-07-30 PROCEDURE — 17003 DESTRUCT PREMALG LES 2-14: CPT

## 2019-07-30 PROCEDURE — 99213 OFFICE O/P EST LOW 20 MIN: CPT | Mod: 25

## 2019-07-30 PROCEDURE — OTHER LIQUID NITROGEN: OTHER

## 2019-07-30 PROCEDURE — 17000 DESTRUCT PREMALG LESION: CPT

## 2019-07-30 ASSESSMENT — LOCATION ZONE DERM
LOCATION ZONE: SCALP
LOCATION ZONE: FACE

## 2019-07-30 ASSESSMENT — LOCATION DETAILED DESCRIPTION DERM
LOCATION DETAILED: LEFT SUPERIOR CENTRAL MALAR CHEEK
LOCATION DETAILED: LEFT CENTRAL EYEBROW
LOCATION DETAILED: RIGHT LATERAL MALAR CHEEK
LOCATION DETAILED: RIGHT CENTRAL FRONTAL SCALP
LOCATION DETAILED: RIGHT MID PREAURICULAR CHEEK
LOCATION DETAILED: RIGHT INFERIOR CENTRAL MALAR CHEEK
LOCATION DETAILED: LEFT SUPERIOR PARIETAL SCALP
LOCATION DETAILED: LEFT SUPERIOR LATERAL MALAR CHEEK

## 2019-07-30 ASSESSMENT — LOCATION SIMPLE DESCRIPTION DERM
LOCATION SIMPLE: LEFT CHEEK
LOCATION SIMPLE: SCALP
LOCATION SIMPLE: LEFT EYEBROW
LOCATION SIMPLE: RIGHT CHEEK
LOCATION SIMPLE: RIGHT SCALP

## 2019-07-30 NOTE — PROCEDURE: DEFER
Introduction Text (Please End With A Colon): The following procedure was deferred:
Scheduling Instructions (Optional): with Dr Ge
Detail Level: Detailed

## 2019-08-21 ENCOUNTER — RX ONLY (RX ONLY)
Age: 79
End: 2019-08-21

## 2019-08-21 RX ORDER — MUPIROCIN 20 MG/G
OINTMENT TOPICAL
Qty: 1 | Refills: 0 | Status: ERX

## 2019-08-27 ENCOUNTER — APPOINTMENT (OUTPATIENT)
Dept: URBAN - METROPOLITAN AREA CLINIC 236 | Age: 79
Setting detail: DERMATOLOGY
End: 2019-08-27

## 2019-08-27 DIAGNOSIS — L57.0 ACTINIC KERATOSIS: ICD-10-CM

## 2019-08-27 DIAGNOSIS — Z85.828 PERSONAL HISTORY OF OTHER MALIGNANT NEOPLASM OF SKIN: ICD-10-CM

## 2019-08-27 PROCEDURE — OTHER POST-OP WOUND EVALUATION: OTHER

## 2019-08-27 PROCEDURE — OTHER OBSERVATION: OTHER

## 2019-08-27 PROCEDURE — OTHER LIQUID NITROGEN: OTHER

## 2019-08-27 PROCEDURE — 17004 DESTROY PREMAL LESIONS 15/>: CPT

## 2019-08-27 PROCEDURE — 99213 OFFICE O/P EST LOW 20 MIN: CPT | Mod: 25

## 2019-08-27 ASSESSMENT — LOCATION SIMPLE DESCRIPTION DERM
LOCATION SIMPLE: RIGHT EYEBROW
LOCATION SIMPLE: LEFT FOREHEAD
LOCATION SIMPLE: NOSE
LOCATION SIMPLE: RIGHT FOREHEAD
LOCATION SIMPLE: RIGHT OCCIPITAL SCALP
LOCATION SIMPLE: RIGHT CHEEK
LOCATION SIMPLE: RIGHT ZYGOMA
LOCATION SIMPLE: LEFT CHEEK

## 2019-08-27 ASSESSMENT — LOCATION DETAILED DESCRIPTION DERM
LOCATION DETAILED: RIGHT FOREHEAD
LOCATION DETAILED: RIGHT SUPERIOR OCCIPITAL SCALP
LOCATION DETAILED: RIGHT EYEBROW
LOCATION DETAILED: RIGHT SUPERIOR CENTRAL MALAR CHEEK
LOCATION DETAILED: LEFT SUPERIOR FOREHEAD
LOCATION DETAILED: LEFT INFERIOR MEDIAL FOREHEAD
LOCATION DETAILED: LEFT SUPERIOR CENTRAL MALAR CHEEK
LOCATION DETAILED: RIGHT CENTRAL EYEBROW
LOCATION DETAILED: RIGHT SUPERIOR MEDIAL FOREHEAD
LOCATION DETAILED: LEFT MEDIAL FOREHEAD
LOCATION DETAILED: RIGHT SUPERIOR PREAURICULAR CHEEK
LOCATION DETAILED: RIGHT MEDIAL ZYGOMA
LOCATION DETAILED: NASAL ROOT
LOCATION DETAILED: LEFT FOREHEAD
LOCATION DETAILED: LEFT INFERIOR FOREHEAD

## 2019-08-27 ASSESSMENT — LOCATION ZONE DERM
LOCATION ZONE: SCALP
LOCATION ZONE: NOSE
LOCATION ZONE: FACE

## 2019-08-27 NOTE — PROCEDURE: LIQUID NITROGEN
Post-Care Instructions: I reviewed with the patient in detail post-care instructions. Patient is to wear sunprotection, and avoid picking at any of the treated lesions. Pt may apply Vaseline to crusted or scabbing areas.
Consent: The patient's consent was obtained including but not limited to risks of crusting, scabbing, blistering, scarring, darker or lighter pigmentary change, recurrence, incomplete removal and infection.
Render Post-Care Instructions In Note?: no
Detail Level: Detailed
Duration Of Freeze Thaw-Cycle (Seconds): 3
Number Of Freeze-Thaw Cycles: 3 freeze-thaw cycles

## 2019-08-27 NOTE — PROCEDURE: POST-OP WOUND EVALUATION
Wound Color?: pink
Detail Level: Detailed
Wound Evaluated By (Optional): AK
Follow Up Units (Optional): 0
Wound Crusting?: clean

## 2019-08-27 NOTE — PROCEDURE: OBSERVATION
Morphology Per Location (Optional): If lesion still present at f/u, bx
X Size Of Lesion In Cm (Optional): 0
Detail Level: Detailed

## 2019-09-24 ENCOUNTER — APPOINTMENT (OUTPATIENT)
Dept: URBAN - METROPOLITAN AREA CLINIC 236 | Age: 79
Setting detail: DERMATOLOGY
End: 2019-09-24

## 2019-09-24 DIAGNOSIS — L57.0 ACTINIC KERATOSIS: ICD-10-CM

## 2019-09-24 DIAGNOSIS — D485 NEOPLASM OF UNCERTAIN BEHAVIOR OF SKIN: ICD-10-CM

## 2019-09-24 DIAGNOSIS — L82.0 INFLAMED SEBORRHEIC KERATOSIS: ICD-10-CM

## 2019-09-24 PROBLEM — D48.5 NEOPLASM OF UNCERTAIN BEHAVIOR OF SKIN: Status: ACTIVE | Noted: 2019-09-24

## 2019-09-24 PROCEDURE — OTHER SHAVE REMOVAL: OTHER

## 2019-09-24 PROCEDURE — A4550 SURGICAL TRAYS: HCPCS

## 2019-09-24 PROCEDURE — 11310 SHAVE SKIN LESION 0.5 CM/<: CPT | Mod: 76

## 2019-09-24 PROCEDURE — 17110 DESTRUCT B9 LESION 1-14: CPT | Mod: 59

## 2019-09-24 PROCEDURE — 11310 SHAVE SKIN LESION 0.5 CM/<: CPT

## 2019-09-24 PROCEDURE — 17000 DESTRUCT PREMALG LESION: CPT | Mod: 59

## 2019-09-24 PROCEDURE — OTHER LIQUID NITROGEN: OTHER

## 2019-09-24 PROCEDURE — 17003 DESTRUCT PREMALG LES 2-14: CPT | Mod: 59

## 2019-09-24 ASSESSMENT — LOCATION DETAILED DESCRIPTION DERM
LOCATION DETAILED: RIGHT CENTRAL FRONTAL SCALP
LOCATION DETAILED: LEFT LATERAL TEMPLE
LOCATION DETAILED: LEFT SUPERIOR NASAL CHEEK
LOCATION DETAILED: LEFT CENTRAL FRONTAL SCALP
LOCATION DETAILED: RIGHT SUPERIOR PARIETAL SCALP
LOCATION DETAILED: RIGHT SUPERIOR CENTRAL MALAR CHEEK
LOCATION DETAILED: RIGHT CENTRAL TEMPLE
LOCATION DETAILED: LEFT MEDIAL TEMPLE
LOCATION DETAILED: LEFT SUPERIOR MEDIAL MALAR CHEEK
LOCATION DETAILED: RIGHT CENTRAL MALAR CHEEK
LOCATION DETAILED: LEFT DISTAL POSTERIOR UPPER ARM

## 2019-09-24 ASSESSMENT — LOCATION ZONE DERM
LOCATION ZONE: FACE
LOCATION ZONE: SCALP
LOCATION ZONE: ARM

## 2019-09-24 ASSESSMENT — LOCATION SIMPLE DESCRIPTION DERM
LOCATION SIMPLE: RIGHT SCALP
LOCATION SIMPLE: RIGHT TEMPLE
LOCATION SIMPLE: LEFT TEMPLE
LOCATION SIMPLE: LEFT UPPER ARM
LOCATION SIMPLE: SCALP
LOCATION SIMPLE: LEFT CHEEK
LOCATION SIMPLE: RIGHT CHEEK
LOCATION SIMPLE: LEFT SCALP

## 2019-09-24 NOTE — PROCEDURE: SHAVE REMOVAL
Render Path Notes In Note?: No
Bill For Surgical Tray: yes
X Size Of Lesion In Cm (Optional): 0
Detail Level: Detailed
Hemostasis: Electrocautery and TCA 25%
Path Notes (To The Dermatopathologist): Please check margins.
Post-Care Instructions: I reviewed with the patient in detail post-care instructions. Patient is to keep the biopsy site dry overnight, and then apply bacitracin twice daily until healed. Patient may apply hydrogen peroxide soaks to remove any crusting.
Biopsy Method: Dermablade
Wound Care: Vaseline
Consent was obtained from the patient. The risks and benefits to therapy were discussed in detail. Specifically, the risks of infection, scarring, bleeding, prolonged wound healing, incomplete removal, allergy to anesthesia, nerve injury and recurrence were addressed. Prior to the procedure, the treatment site was clearly identified and confirmed by the patient. All components of Universal Protocol/PAUSE Rule completed.
Billing Type: Third-Party Bill
Anesthesia Type: 1% lidocaine with epinephrine
Medical Necessity Information: It is in your best interest to select a reason for this procedure from the list below. All of these items fulfill various CMS LCD requirements except the new and changing color options.
Medical Necessity Clause: This procedure was medically necessary because the lesion that was treated was:
Size Of Lesion In Cm (Required): 0.2
Notification Instructions: Patient will be notified of biopsy results. However, patient instructed to call the office if not contacted within 2 weeks.

## 2019-09-24 NOTE — HPI: SKIN LESION
How Severe Is Your Skin Lesion?: mild
Has Your Skin Lesion Been Treated?: not been treated
Is This A New Presentation, Or A Follow-Up?: Skin Lesion
Additional History: Patient would like reassurance

## 2019-09-24 NOTE — PROCEDURE: LIQUID NITROGEN
Number Of Freeze-Thaw Cycles: 3 freeze-thaw cycles
Include Z78.9 (Other Specified Conditions Influencing Health Status) As An Associated Diagnosis?: Yes
Duration Of Freeze Thaw-Cycle (Seconds): 3
Render Note In Bullet Format When Appropriate: No
Detail Level: Detailed
Medical Necessity Clause: This procedure was medically necessary because the lesions that were treated were:
Post-Care Instructions: I reviewed with the patient in detail post-care instructions. Patient is to wear sunprotection, and avoid picking at any of the treated lesions. Pt may apply Vaseline to crusted or scabbing areas.
Consent: The patient's consent was obtained including but not limited to risks of crusting, scabbing, blistering, scarring, darker or lighter pigmentary change, recurrence, incomplete removal and infection.
Medical Necessity Information: It is in your best interest to select a reason for this procedure from the list below. All of these items fulfill various CMS LCD requirements except the new and changing color options.

## 2019-10-22 ENCOUNTER — APPOINTMENT (OUTPATIENT)
Dept: URBAN - METROPOLITAN AREA CLINIC 236 | Age: 79
Setting detail: DERMATOLOGY
End: 2019-10-23

## 2019-10-22 DIAGNOSIS — L57.0 ACTINIC KERATOSIS: ICD-10-CM

## 2019-10-22 PROBLEM — C44.329 SQUAMOUS CELL CARCINOMA OF SKIN OF OTHER PARTS OF FACE: Status: ACTIVE | Noted: 2019-10-22

## 2019-10-22 PROCEDURE — 17003 DESTRUCT PREMALG LES 2-14: CPT

## 2019-10-22 PROCEDURE — OTHER DEFER: OTHER

## 2019-10-22 PROCEDURE — OTHER COUNSELING: OTHER

## 2019-10-22 PROCEDURE — OTHER PATHOLOGY DISCUSSION: OTHER

## 2019-10-22 PROCEDURE — 17000 DESTRUCT PREMALG LESION: CPT

## 2019-10-22 PROCEDURE — OTHER LIQUID NITROGEN: OTHER

## 2019-10-22 PROCEDURE — 99213 OFFICE O/P EST LOW 20 MIN: CPT | Mod: 25

## 2019-10-22 ASSESSMENT — LOCATION DETAILED DESCRIPTION DERM
LOCATION DETAILED: RIGHT CENTRAL TEMPLE
LOCATION DETAILED: RIGHT SUPERIOR CENTRAL MALAR CHEEK
LOCATION DETAILED: RIGHT CENTRAL MALAR CHEEK
LOCATION DETAILED: LEFT SUPERIOR NASAL CHEEK
LOCATION DETAILED: LEFT CENTRAL FRONTAL SCALP
LOCATION DETAILED: RIGHT SUPERIOR PARIETAL SCALP
LOCATION DETAILED: LEFT LATERAL TEMPLE
LOCATION DETAILED: RIGHT CENTRAL FRONTAL SCALP
LOCATION DETAILED: LEFT MEDIAL TEMPLE

## 2019-10-22 ASSESSMENT — LOCATION SIMPLE DESCRIPTION DERM
LOCATION SIMPLE: SCALP
LOCATION SIMPLE: RIGHT CHEEK
LOCATION SIMPLE: LEFT CHEEK
LOCATION SIMPLE: LEFT TEMPLE
LOCATION SIMPLE: RIGHT SCALP
LOCATION SIMPLE: RIGHT TEMPLE
LOCATION SIMPLE: LEFT SCALP

## 2019-10-22 ASSESSMENT — LOCATION ZONE DERM
LOCATION ZONE: SCALP
LOCATION ZONE: FACE

## 2019-10-22 NOTE — PROCEDURE: DEFER
Scheduling Instructions (Optional): patient prefers Dr Ge
Detail Level: Detailed
Introduction Text (Please End With A Colon): The following procedure was deferred: ln2

## 2019-10-22 NOTE — PROCEDURE: LIQUID NITROGEN
Render Post-Care Instructions In Note?: no
Detail Level: Detailed
Duration Of Freeze Thaw-Cycle (Seconds): 3
Number Of Freeze-Thaw Cycles: 3 freeze-thaw cycles
Consent: The patient's consent was obtained including but not limited to risks of crusting, scabbing, blistering, scarring, darker or lighter pigmentary change, recurrence, incomplete removal and infection.
Post-Care Instructions: I reviewed with the patient in detail post-care instructions. Patient is to wear sunprotection, and avoid picking at any of the treated lesions. Pt may apply Vaseline to crusted or scabbing areas.

## 2019-11-19 ENCOUNTER — APPOINTMENT (OUTPATIENT)
Dept: URBAN - METROPOLITAN AREA CLINIC 236 | Age: 79
Setting detail: DERMATOLOGY
End: 2019-11-20

## 2019-11-19 DIAGNOSIS — L57.0 ACTINIC KERATOSIS: ICD-10-CM

## 2019-11-19 PROBLEM — C44.329 SQUAMOUS CELL CARCINOMA OF SKIN OF OTHER PARTS OF FACE: Status: ACTIVE | Noted: 2019-11-19

## 2019-11-19 PROCEDURE — 17004 DESTROY PREMAL LESIONS 15/>: CPT

## 2019-11-19 PROCEDURE — OTHER LIQUID NITROGEN: OTHER

## 2019-11-19 PROCEDURE — OTHER ADDITIONAL NOTES: OTHER

## 2019-11-19 PROCEDURE — OTHER COUNSELING: OTHER

## 2019-11-19 PROCEDURE — OTHER DEFER: OTHER

## 2019-11-19 PROCEDURE — 99213 OFFICE O/P EST LOW 20 MIN: CPT | Mod: 25

## 2019-11-19 ASSESSMENT — LOCATION SIMPLE DESCRIPTION DERM
LOCATION SIMPLE: RIGHT FOREHEAD
LOCATION SIMPLE: RIGHT SCALP
LOCATION SIMPLE: LEFT SCALP
LOCATION SIMPLE: RIGHT CHEEK
LOCATION SIMPLE: LEFT TEMPLE
LOCATION SIMPLE: LEFT FOREHEAD
LOCATION SIMPLE: RIGHT TEMPLE
LOCATION SIMPLE: SCALP
LOCATION SIMPLE: LEFT CHEEK

## 2019-11-19 ASSESSMENT — LOCATION DETAILED DESCRIPTION DERM
LOCATION DETAILED: RIGHT CENTRAL FRONTAL SCALP
LOCATION DETAILED: LEFT MEDIAL TEMPLE
LOCATION DETAILED: RIGHT CENTRAL MALAR CHEEK
LOCATION DETAILED: RIGHT SUPERIOR CENTRAL MALAR CHEEK
LOCATION DETAILED: RIGHT MID PREAURICULAR CHEEK
LOCATION DETAILED: RIGHT SUPERIOR PARIETAL SCALP
LOCATION DETAILED: RIGHT INFERIOR FOREHEAD
LOCATION DETAILED: LEFT MEDIAL FOREHEAD
LOCATION DETAILED: RIGHT CENTRAL TEMPLE
LOCATION DETAILED: LEFT SUPERIOR NASAL CHEEK
LOCATION DETAILED: LEFT CENTRAL FRONTAL SCALP
LOCATION DETAILED: LEFT LATERAL TEMPLE
LOCATION DETAILED: LEFT CENTRAL POSTAURICULAR SKIN

## 2019-11-19 ASSESSMENT — LOCATION ZONE DERM
LOCATION ZONE: FACE
LOCATION ZONE: SCALP

## 2019-11-19 NOTE — PROCEDURE: LIQUID NITROGEN
Duration Of Freeze Thaw-Cycle (Seconds): 3
Consent: The patient's consent was obtained including but not limited to risks of crusting, scabbing, blistering, scarring, darker or lighter pigmentary change, recurrence, incomplete removal and infection.
Post-Care Instructions: I reviewed with the patient in detail post-care instructions. Patient is to wear sunprotection, and avoid picking at any of the treated lesions. Pt may apply Vaseline to crusted or scabbing areas.
Render Post-Care Instructions In Note?: no
Detail Level: Detailed
Number Of Freeze-Thaw Cycles: 3 freeze-thaw cycles

## 2019-11-19 NOTE — PROCEDURE: DEFER
Detail Level: Detailed
Introduction Text (Please End With A Colon): The following procedure was deferred: ln2
Scheduling Instructions (Optional): patient prefers Dr Ge

## 2019-12-31 ENCOUNTER — APPOINTMENT (OUTPATIENT)
Dept: URBAN - METROPOLITAN AREA CLINIC 236 | Age: 79
Setting detail: DERMATOLOGY
End: 2019-12-31

## 2019-12-31 DIAGNOSIS — L57.0 ACTINIC KERATOSIS: ICD-10-CM

## 2019-12-31 PROCEDURE — 17004 DESTROY PREMAL LESIONS 15/>: CPT

## 2019-12-31 PROCEDURE — OTHER COUNSELING: OTHER

## 2019-12-31 PROCEDURE — OTHER LIQUID NITROGEN: OTHER

## 2019-12-31 ASSESSMENT — LOCATION SIMPLE DESCRIPTION DERM
LOCATION SIMPLE: LEFT EAR
LOCATION SIMPLE: FRONTAL SCALP
LOCATION SIMPLE: RIGHT ZYGOMA
LOCATION SIMPLE: LEFT OCCIPITAL SCALP
LOCATION SIMPLE: POSTERIOR SCALP
LOCATION SIMPLE: LEFT ZYGOMA
LOCATION SIMPLE: LEFT FOREHEAD
LOCATION SIMPLE: RIGHT CHEEK
LOCATION SIMPLE: SCALP
LOCATION SIMPLE: RIGHT FOREHEAD
LOCATION SIMPLE: LEFT CHEEK
LOCATION SIMPLE: LEFT TEMPLE
LOCATION SIMPLE: RIGHT OCCIPITAL SCALP

## 2019-12-31 ASSESSMENT — LOCATION DETAILED DESCRIPTION DERM
LOCATION DETAILED: RIGHT SUPERIOR OCCIPITAL SCALP
LOCATION DETAILED: LEFT LATERAL TEMPLE
LOCATION DETAILED: LEFT SUPERIOR OCCIPITAL SCALP
LOCATION DETAILED: RIGHT MEDIAL ZYGOMA
LOCATION DETAILED: LEFT FOREHEAD
LOCATION DETAILED: MEDIAL FRONTAL SCALP
LOCATION DETAILED: LEFT SUPERIOR POSTERIOR HELIX
LOCATION DETAILED: RIGHT LATERAL FOREHEAD
LOCATION DETAILED: LEFT INFERIOR MEDIAL MALAR CHEEK
LOCATION DETAILED: LEFT SUPERIOR MEDIAL MALAR CHEEK
LOCATION DETAILED: RIGHT SUPERIOR MEDIAL FOREHEAD
LOCATION DETAILED: LEFT MEDIAL MALAR CHEEK
LOCATION DETAILED: LEFT INFERIOR CENTRAL MALAR CHEEK
LOCATION DETAILED: RIGHT SUPERIOR FRONTAL SCALP
LOCATION DETAILED: LEFT SUPERIOR PARIETAL SCALP
LOCATION DETAILED: POSTERIOR MID-PARIETAL SCALP
LOCATION DETAILED: RIGHT INFERIOR LATERAL MALAR CHEEK
LOCATION DETAILED: RIGHT SUPERIOR FOREHEAD
LOCATION DETAILED: RIGHT CENTRAL MALAR CHEEK
LOCATION DETAILED: LEFT MEDIAL ZYGOMA
LOCATION DETAILED: RIGHT INFERIOR LATERAL FOREHEAD

## 2019-12-31 ASSESSMENT — LOCATION ZONE DERM
LOCATION ZONE: EAR
LOCATION ZONE: FACE
LOCATION ZONE: SCALP

## 2019-12-31 NOTE — PROCEDURE: LIQUID NITROGEN
Render Note In Bullet Format When Appropriate: No
Detail Level: Detailed
Number Of Freeze-Thaw Cycles: 3 freeze-thaw cycles
Duration Of Freeze Thaw-Cycle (Seconds): 3
Consent: The patient's consent was obtained including but not limited to risks of crusting, scabbing, blistering, scarring, darker or lighter pigmentary change, recurrence, incomplete removal and infection.
Post-Care Instructions: I reviewed with the patient in detail post-care instructions. Patient is to wear sunprotection, and avoid picking at any of the treated lesions. Pt may apply Vaseline to crusted or scabbing areas.

## 2020-01-28 ENCOUNTER — APPOINTMENT (OUTPATIENT)
Dept: URBAN - METROPOLITAN AREA CLINIC 236 | Age: 80
Setting detail: DERMATOLOGY
End: 2020-01-28

## 2020-01-28 DIAGNOSIS — Z48.02 ENCOUNTER FOR REMOVAL OF SUTURES: ICD-10-CM

## 2020-01-28 DIAGNOSIS — L30.4 ERYTHEMA INTERTRIGO: ICD-10-CM

## 2020-01-28 DIAGNOSIS — D485 NEOPLASM OF UNCERTAIN BEHAVIOR OF SKIN: ICD-10-CM

## 2020-01-28 DIAGNOSIS — L57.0 ACTINIC KERATOSIS: ICD-10-CM

## 2020-01-28 PROBLEM — D48.5 NEOPLASM OF UNCERTAIN BEHAVIOR OF SKIN: Status: ACTIVE | Noted: 2020-01-28

## 2020-01-28 PROCEDURE — 11310 SHAVE SKIN LESION 0.5 CM/<: CPT | Mod: 59

## 2020-01-28 PROCEDURE — 99213 OFFICE O/P EST LOW 20 MIN: CPT | Mod: 25

## 2020-01-28 PROCEDURE — OTHER PRESCRIPTION: OTHER

## 2020-01-28 PROCEDURE — OTHER LIQUID NITROGEN: OTHER

## 2020-01-28 PROCEDURE — OTHER TREATMENT REGIMEN: OTHER

## 2020-01-28 PROCEDURE — A4550 SURGICAL TRAYS: HCPCS

## 2020-01-28 PROCEDURE — OTHER COUNSELING: OTHER

## 2020-01-28 PROCEDURE — OTHER SHAVE REMOVAL: OTHER

## 2020-01-28 PROCEDURE — 17004 DESTROY PREMAL LESIONS 15/>: CPT

## 2020-01-28 RX ORDER — NYSTATIN 100000 [USP'U]/G
OINTMENT TOPICAL
Qty: 2 | Refills: 1 | Status: ERX | COMMUNITY
Start: 2020-01-28

## 2020-01-28 RX ORDER — HYDROCORTISONE 25 MG/G
OINTMENT TOPICAL
Qty: 1 | Refills: 0 | Status: ERX | COMMUNITY
Start: 2020-01-28

## 2020-01-28 ASSESSMENT — LOCATION DETAILED DESCRIPTION DERM
LOCATION DETAILED: LEFT MEDIAL MALAR CHEEK
LOCATION DETAILED: RIGHT INFERIOR LATERAL FOREHEAD
LOCATION DETAILED: RIGHT LATERAL FOREHEAD
LOCATION DETAILED: RIGHT SUPERIOR FOREHEAD
LOCATION DETAILED: POSTERIOR MID-PARIETAL SCALP
LOCATION DETAILED: LEFT SUPERIOR POSTERIOR HELIX
LOCATION DETAILED: MEDIAL FRONTAL SCALP
LOCATION DETAILED: LEFT SUPERIOR OCCIPITAL SCALP
LOCATION DETAILED: LEFT MEDIAL ZYGOMA
LOCATION DETAILED: RIGHT BUTTOCK
LOCATION DETAILED: RIGHT SUPERIOR MEDIAL FOREHEAD
LOCATION DETAILED: LEFT LATERAL TEMPLE
LOCATION DETAILED: LEFT SUPERIOR PARIETAL SCALP
LOCATION DETAILED: RIGHT INFERIOR LATERAL MALAR CHEEK
LOCATION DETAILED: RIGHT CENTRAL MALAR CHEEK
LOCATION DETAILED: RIGHT SUPERIOR FRONTAL SCALP
LOCATION DETAILED: RIGHT MEDIAL ZYGOMA
LOCATION DETAILED: LEFT SUPERIOR MEDIAL MALAR CHEEK
LOCATION DETAILED: RIGHT SUPERIOR OCCIPITAL SCALP
LOCATION DETAILED: LEFT FOREHEAD

## 2020-01-28 ASSESSMENT — LOCATION ZONE DERM
LOCATION ZONE: EAR
LOCATION ZONE: SCALP
LOCATION ZONE: TRUNK
LOCATION ZONE: FACE

## 2020-01-28 ASSESSMENT — LOCATION SIMPLE DESCRIPTION DERM
LOCATION SIMPLE: LEFT FOREHEAD
LOCATION SIMPLE: LEFT OCCIPITAL SCALP
LOCATION SIMPLE: LEFT CHEEK
LOCATION SIMPLE: FRONTAL SCALP
LOCATION SIMPLE: POSTERIOR SCALP
LOCATION SIMPLE: RIGHT BUTTOCK
LOCATION SIMPLE: RIGHT OCCIPITAL SCALP
LOCATION SIMPLE: LEFT TEMPLE
LOCATION SIMPLE: RIGHT ZYGOMA
LOCATION SIMPLE: LEFT EAR
LOCATION SIMPLE: RIGHT FOREHEAD
LOCATION SIMPLE: RIGHT CHEEK
LOCATION SIMPLE: SCALP
LOCATION SIMPLE: LEFT ZYGOMA

## 2020-01-28 NOTE — PROCEDURE: LIQUID NITROGEN
Render Post-Care Instructions In Note?: no
Consent: The patient's consent was obtained including but not limited to risks of crusting, scabbing, blistering, scarring, darker or lighter pigmentary change, recurrence, incomplete removal and infection.
Detail Level: Detailed
Duration Of Freeze Thaw-Cycle (Seconds): 3
Post-Care Instructions: I reviewed with the patient in detail post-care instructions. Patient is to wear sunprotection, and avoid picking at any of the treated lesions. Pt may apply Vaseline to crusted or scabbing areas.
Number Of Freeze-Thaw Cycles: 3 freeze-thaw cycles

## 2020-01-28 NOTE — PROCEDURE: SHAVE REMOVAL
X Size Of Lesion In Cm (Optional): 0
Anesthesia Type: 1% lidocaine with epinephrine
Biopsy Method: Dermablade
Bill 44120 For Specimen Handling/Conveyance To Laboratory?: no
Wound Care: Petrolatum
Billing Type: Third-Party Bill
Medical Necessity Information: It is in your best interest to select a reason for this procedure from the list below. All of these items fulfill various CMS LCD requirements except the new and changing color options.
Was A Bandage Applied: Yes
Medical Necessity Clause: This procedure was medically necessary because the lesion that was treated was:
Path Notes (To The Dermatopathologist): Please check margins.
Consent was obtained from the patient. The risks and benefits to therapy were discussed in detail. Specifically, the risks of infection, scarring, bleeding, prolonged wound healing, incomplete removal, allergy to anesthesia, nerve injury and recurrence were addressed. Prior to the procedure, the treatment site was clearly identified and confirmed by the patient. All components of Universal Protocol/PAUSE Rule completed.
Notification Instructions: Patient will be notified of biopsy results. However, patient instructed to call the office if not contacted within 2 weeks.
Detail Level: Detailed
Size Of Lesion In Cm (Required): 0.2
Post-Care Instructions: I reviewed with the patient in detail post-care instructions. Patient is to keep the biopsy site dry overnight, and then apply bacitracin twice daily until healed. Patient may apply hydrogen peroxide soaks to remove any crusting.
Hemostasis: Drysol and Electrocautery

## 2020-01-28 NOTE — PROCEDURE: TREATMENT REGIMEN
Initiate Treatment: nystatin 100,000 unit/gram topical ointment \\nDays Supply: 30\\nMix 50/50 with hydrocortisone 2.5% and apply to affected area on buttocks twice a day for one week and once a day for week 2.\\n\\nhydrocortisone 2.5 % topical ointment \\nDays Supply: 30\\nMix 50/50 with nystatin and apply to affected area on buttocks twice a day for one week and once a day for second week.
Detail Level: Zone

## 2020-03-05 ENCOUNTER — APPOINTMENT (OUTPATIENT)
Dept: URBAN - METROPOLITAN AREA CLINIC 236 | Age: 80
Setting detail: DERMATOLOGY
End: 2020-03-06

## 2020-03-05 DIAGNOSIS — L89 PRESSURE ULCER: ICD-10-CM

## 2020-03-05 DIAGNOSIS — L57.0 ACTINIC KERATOSIS: ICD-10-CM

## 2020-03-05 PROBLEM — L89.321 PRESSURE ULCER OF LEFT BUTTOCK, STAGE 1: Status: ACTIVE | Noted: 2020-03-05

## 2020-03-05 PROCEDURE — OTHER DIAGNOSIS COMMENT: OTHER

## 2020-03-05 PROCEDURE — OTHER LIQUID NITROGEN: OTHER

## 2020-03-05 PROCEDURE — 99213 OFFICE O/P EST LOW 20 MIN: CPT | Mod: 25

## 2020-03-05 PROCEDURE — OTHER COUNSELING: OTHER

## 2020-03-05 PROCEDURE — OTHER PRESCRIPTION: OTHER

## 2020-03-05 PROCEDURE — OTHER PATHOLOGY DISCUSSION: OTHER

## 2020-03-05 PROCEDURE — 17004 DESTROY PREMAL LESIONS 15/>: CPT

## 2020-03-05 PROCEDURE — OTHER TREATMENT REGIMEN: OTHER

## 2020-03-05 RX ORDER — MUPIROCIN 20 MG/G
OINTMENT TOPICAL
Qty: 2 | Refills: 0 | Status: ERX

## 2020-03-05 ASSESSMENT — LOCATION SIMPLE DESCRIPTION DERM
LOCATION SIMPLE: RIGHT CHEEK
LOCATION SIMPLE: LEFT OCCIPITAL SCALP
LOCATION SIMPLE: LEFT BUTTOCK
LOCATION SIMPLE: RIGHT ZYGOMA
LOCATION SIMPLE: FRONTAL SCALP
LOCATION SIMPLE: SCALP
LOCATION SIMPLE: POSTERIOR SCALP
LOCATION SIMPLE: LEFT FOREHEAD
LOCATION SIMPLE: RIGHT FOREHEAD
LOCATION SIMPLE: LEFT TEMPLE
LOCATION SIMPLE: LEFT EAR
LOCATION SIMPLE: LEFT CHEEK
LOCATION SIMPLE: RIGHT OCCIPITAL SCALP

## 2020-03-05 ASSESSMENT — LOCATION ZONE DERM
LOCATION ZONE: FACE
LOCATION ZONE: SCALP
LOCATION ZONE: TRUNK
LOCATION ZONE: EAR

## 2020-03-05 ASSESSMENT — LOCATION DETAILED DESCRIPTION DERM
LOCATION DETAILED: LEFT MEDIAL BUTTOCK
LOCATION DETAILED: RIGHT INFERIOR LATERAL FOREHEAD
LOCATION DETAILED: POSTERIOR MID-PARIETAL SCALP
LOCATION DETAILED: LEFT SUPERIOR MEDIAL MALAR CHEEK
LOCATION DETAILED: RIGHT SUPERIOR OCCIPITAL SCALP
LOCATION DETAILED: RIGHT MEDIAL ZYGOMA
LOCATION DETAILED: LEFT SUPERIOR PARIETAL SCALP
LOCATION DETAILED: LEFT SUPERIOR OCCIPITAL SCALP
LOCATION DETAILED: RIGHT INFERIOR LATERAL MALAR CHEEK
LOCATION DETAILED: RIGHT CENTRAL MALAR CHEEK
LOCATION DETAILED: RIGHT SUPERIOR FOREHEAD
LOCATION DETAILED: LEFT FOREHEAD
LOCATION DETAILED: LEFT MEDIAL MALAR CHEEK
LOCATION DETAILED: RIGHT SUPERIOR FRONTAL SCALP
LOCATION DETAILED: RIGHT LATERAL FOREHEAD
LOCATION DETAILED: MEDIAL FRONTAL SCALP
LOCATION DETAILED: LEFT SUPERIOR POSTERIOR HELIX
LOCATION DETAILED: RIGHT SUPERIOR MEDIAL FOREHEAD
LOCATION DETAILED: LEFT LATERAL TEMPLE

## 2020-03-05 NOTE — PROCEDURE: TREATMENT REGIMEN
Initiate Treatment: mupirocin 2 % topical ointment \\nApply to affected areas of the buttock twice per day.
Detail Level: Detailed

## 2020-04-07 ENCOUNTER — APPOINTMENT (OUTPATIENT)
Dept: URBAN - METROPOLITAN AREA CLINIC 236 | Age: 80
Setting detail: DERMATOLOGY
End: 2020-04-07

## 2020-04-07 VITALS — TEMPERATURE: 97.2 F

## 2020-04-07 DIAGNOSIS — L89 PRESSURE ULCER: ICD-10-CM

## 2020-04-07 DIAGNOSIS — L57.0 ACTINIC KERATOSIS: ICD-10-CM

## 2020-04-07 PROBLEM — L89.321 PRESSURE ULCER OF LEFT BUTTOCK, STAGE 1: Status: ACTIVE | Noted: 2020-04-07

## 2020-04-07 PROCEDURE — 99213 OFFICE O/P EST LOW 20 MIN: CPT | Mod: 25

## 2020-04-07 PROCEDURE — OTHER COUNSELING: OTHER

## 2020-04-07 PROCEDURE — OTHER LIQUID NITROGEN: OTHER

## 2020-04-07 PROCEDURE — OTHER TREATMENT REGIMEN: OTHER

## 2020-04-07 PROCEDURE — 17003 DESTRUCT PREMALG LES 2-14: CPT

## 2020-04-07 PROCEDURE — 17000 DESTRUCT PREMALG LESION: CPT

## 2020-04-07 PROCEDURE — OTHER PRESCRIPTION: OTHER

## 2020-04-07 RX ORDER — MUPIROCIN 20 MG/G
OINTMENT TOPICAL
Qty: 2 | Refills: 1 | Status: ERX

## 2020-04-07 ASSESSMENT — LOCATION DETAILED DESCRIPTION DERM
LOCATION DETAILED: LEFT INFERIOR CENTRAL MALAR CHEEK
LOCATION DETAILED: LEFT INFERIOR FOREHEAD
LOCATION DETAILED: RIGHT SUPERIOR FOREHEAD
LOCATION DETAILED: GLABELLA
LOCATION DETAILED: LEFT MEDIAL ZYGOMA
LOCATION DETAILED: RIGHT SUPERIOR MEDIAL FOREHEAD
LOCATION DETAILED: LEFT LATERAL MANDIBULAR CHEEK
LOCATION DETAILED: LEFT SUPERIOR CENTRAL MALAR CHEEK
LOCATION DETAILED: LEFT MEDIAL BUTTOCK
LOCATION DETAILED: LEFT CENTRAL TEMPLE
LOCATION DETAILED: RIGHT INFERIOR FOREHEAD
LOCATION DETAILED: LEFT NASAL SIDEWALL
LOCATION DETAILED: RIGHT SUPERIOR PARIETAL SCALP
LOCATION DETAILED: RIGHT CENTRAL ZYGOMA

## 2020-04-07 ASSESSMENT — LOCATION SIMPLE DESCRIPTION DERM
LOCATION SIMPLE: RIGHT FOREHEAD
LOCATION SIMPLE: SCALP
LOCATION SIMPLE: LEFT NOSE
LOCATION SIMPLE: GLABELLA
LOCATION SIMPLE: RIGHT ZYGOMA
LOCATION SIMPLE: LEFT CHEEK
LOCATION SIMPLE: LEFT BUTTOCK
LOCATION SIMPLE: LEFT TEMPLE
LOCATION SIMPLE: LEFT FOREHEAD
LOCATION SIMPLE: LEFT ZYGOMA

## 2020-04-07 ASSESSMENT — LOCATION ZONE DERM
LOCATION ZONE: NOSE
LOCATION ZONE: TRUNK
LOCATION ZONE: FACE
LOCATION ZONE: SCALP

## 2020-04-07 NOTE — PROCEDURE: TREATMENT REGIMEN
Continue Regimen: mupirocin 2 % topical ointment \\nApply to affected areas of the buttock twice per day.
Detail Level: Detailed

## 2020-04-07 NOTE — PROCEDURE: LIQUID NITROGEN
Consent: The patient's consent was obtained including but not limited to risks of crusting, scabbing, blistering, scarring, darker or lighter pigmentary change, recurrence, incomplete removal and infection.
Number Of Freeze-Thaw Cycles: 3 freeze-thaw cycles
Duration Of Freeze Thaw-Cycle (Seconds): 3
Post-Care Instructions: I reviewed with the patient in detail post-care instructions. Patient is to wear sunprotection, and avoid picking at any of the treated lesions. Pt may apply Vaseline to crusted or scabbing areas.
Render Note In Bullet Format When Appropriate: No
Detail Level: Detailed

## 2020-07-20 ENCOUNTER — APPOINTMENT (OUTPATIENT)
Dept: URBAN - METROPOLITAN AREA CLINIC 236 | Age: 80
Setting detail: DERMATOLOGY
End: 2020-07-21

## 2020-07-20 DIAGNOSIS — L21.8 OTHER SEBORRHEIC DERMATITIS: ICD-10-CM

## 2020-07-20 DIAGNOSIS — L57.0 ACTINIC KERATOSIS: ICD-10-CM

## 2020-07-20 DIAGNOSIS — L89 PRESSURE ULCER: ICD-10-CM

## 2020-07-20 PROBLEM — L89.321 PRESSURE ULCER OF LEFT BUTTOCK, STAGE 1: Status: ACTIVE | Noted: 2020-07-20

## 2020-07-20 PROCEDURE — 17000 DESTRUCT PREMALG LESION: CPT

## 2020-07-20 PROCEDURE — OTHER PRESCRIPTION: OTHER

## 2020-07-20 PROCEDURE — OTHER TREATMENT REGIMEN: OTHER

## 2020-07-20 PROCEDURE — 17003 DESTRUCT PREMALG LES 2-14: CPT

## 2020-07-20 PROCEDURE — 99213 OFFICE O/P EST LOW 20 MIN: CPT | Mod: 25

## 2020-07-20 PROCEDURE — OTHER COUNSELING: OTHER

## 2020-07-20 PROCEDURE — OTHER LIQUID NITROGEN: OTHER

## 2020-07-20 RX ORDER — KETOCONAZOLE 20 MG/G
CREAM TOPICAL
Qty: 1 | Refills: 0 | Status: ERX | COMMUNITY
Start: 2020-07-20

## 2020-07-20 RX ORDER — MUPIROCIN 20 MG/G
OINTMENT TOPICAL
Qty: 2 | Refills: 1 | Status: ERX

## 2020-07-20 ASSESSMENT — LOCATION SIMPLE DESCRIPTION DERM
LOCATION SIMPLE: LEFT BUTTOCK
LOCATION SIMPLE: RIGHT ZYGOMA
LOCATION SIMPLE: LEFT CHEEK
LOCATION SIMPLE: SCALP
LOCATION SIMPLE: LEFT TEMPLE
LOCATION SIMPLE: LEFT ZYGOMA
LOCATION SIMPLE: NOSE
LOCATION SIMPLE: GLABELLA
LOCATION SIMPLE: LEFT NOSE
LOCATION SIMPLE: RIGHT FOREHEAD
LOCATION SIMPLE: LEFT FOREHEAD

## 2020-07-20 ASSESSMENT — LOCATION DETAILED DESCRIPTION DERM
LOCATION DETAILED: LEFT MEDIAL BUTTOCK
LOCATION DETAILED: LEFT CENTRAL TEMPLE
LOCATION DETAILED: LEFT INFERIOR FOREHEAD
LOCATION DETAILED: LEFT MEDIAL ZYGOMA
LOCATION DETAILED: LEFT LATERAL MANDIBULAR CHEEK
LOCATION DETAILED: RIGHT CENTRAL ZYGOMA
LOCATION DETAILED: NASAL ROOT
LOCATION DETAILED: LEFT NASAL SIDEWALL
LOCATION DETAILED: RIGHT SUPERIOR FOREHEAD
LOCATION DETAILED: RIGHT INFERIOR FOREHEAD
LOCATION DETAILED: LEFT SUPERIOR CENTRAL MALAR CHEEK
LOCATION DETAILED: GLABELLA
LOCATION DETAILED: RIGHT SUPERIOR PARIETAL SCALP
LOCATION DETAILED: RIGHT SUPERIOR MEDIAL FOREHEAD
LOCATION DETAILED: LEFT INFERIOR CENTRAL MALAR CHEEK

## 2020-07-20 ASSESSMENT — LOCATION ZONE DERM
LOCATION ZONE: NOSE
LOCATION ZONE: FACE
LOCATION ZONE: TRUNK
LOCATION ZONE: SCALP

## 2020-07-20 NOTE — PROCEDURE: TREATMENT REGIMEN
Initiate Treatment: ketoconazole 2 % topical cream \\nApply to affected areas of the face twice per day for 2 weeks.
Continue Regimen: mupirocin 2 % topical ointment \\nApply to affected areas of the buttock twice per day.
Detail Level: Detailed

## 2020-07-20 NOTE — PROCEDURE: LIQUID NITROGEN
Render Post-Care Instructions In Note?: no
Post-Care Instructions: I reviewed with the patient in detail post-care instructions. Patient is to wear sunprotection, and avoid picking at any of the treated lesions. Pt may apply Vaseline to crusted or scabbing areas.
Number Of Freeze-Thaw Cycles: 3 freeze-thaw cycles
Detail Level: Detailed
Consent: The patient's consent was obtained including but not limited to risks of crusting, scabbing, blistering, scarring, darker or lighter pigmentary change, recurrence, incomplete removal and infection.
Duration Of Freeze Thaw-Cycle (Seconds): 3

## 2020-08-17 ENCOUNTER — APPOINTMENT (OUTPATIENT)
Dept: URBAN - METROPOLITAN AREA CLINIC 236 | Age: 80
Setting detail: DERMATOLOGY
End: 2020-08-18

## 2020-08-17 DIAGNOSIS — L89 PRESSURE ULCER: ICD-10-CM

## 2020-08-17 DIAGNOSIS — L57.0 ACTINIC KERATOSIS: ICD-10-CM

## 2020-08-17 PROBLEM — L89.321 PRESSURE ULCER OF LEFT BUTTOCK, STAGE 1: Status: ACTIVE | Noted: 2020-08-17

## 2020-08-17 PROCEDURE — OTHER TREATMENT REGIMEN: OTHER

## 2020-08-17 PROCEDURE — 99213 OFFICE O/P EST LOW 20 MIN: CPT | Mod: 25

## 2020-08-17 PROCEDURE — 17004 DESTROY PREMAL LESIONS 15/>: CPT

## 2020-08-17 PROCEDURE — OTHER LIQUID NITROGEN: OTHER

## 2020-08-17 PROCEDURE — OTHER COUNSELING: OTHER

## 2020-08-17 PROCEDURE — OTHER PRESCRIPTION: OTHER

## 2020-08-17 RX ORDER — MUPIROCIN 20 MG/G
OINTMENT TOPICAL
Qty: 2 | Refills: 1 | Status: ERX

## 2020-08-17 ASSESSMENT — LOCATION SIMPLE DESCRIPTION DERM
LOCATION SIMPLE: RIGHT CHEEK
LOCATION SIMPLE: RIGHT FOREHEAD
LOCATION SIMPLE: LEFT BUTTOCK
LOCATION SIMPLE: LEFT ZYGOMA
LOCATION SIMPLE: RIGHT OCCIPITAL SCALP
LOCATION SIMPLE: LEFT FOREHEAD
LOCATION SIMPLE: POSTERIOR SCALP
LOCATION SIMPLE: NOSE
LOCATION SIMPLE: LEFT CHEEK
LOCATION SIMPLE: SCALP
LOCATION SIMPLE: LEFT EAR

## 2020-08-17 ASSESSMENT — LOCATION DETAILED DESCRIPTION DERM
LOCATION DETAILED: LEFT INFERIOR LATERAL MALAR CHEEK
LOCATION DETAILED: RIGHT SUPERIOR FOREHEAD
LOCATION DETAILED: NASAL DORSUM
LOCATION DETAILED: RIGHT INFERIOR MEDIAL MALAR CHEEK
LOCATION DETAILED: LEFT INFERIOR CENTRAL MALAR CHEEK
LOCATION DETAILED: LEFT SUPERIOR FOREHEAD
LOCATION DETAILED: LEFT SUPERIOR MEDIAL FOREHEAD
LOCATION DETAILED: LEFT FOREHEAD
LOCATION DETAILED: LEFT MEDIAL BUTTOCK
LOCATION DETAILED: LEFT INFERIOR MEDIAL FOREHEAD
LOCATION DETAILED: RIGHT SUPERIOR OCCIPITAL SCALP
LOCATION DETAILED: LEFT CENTRAL ZYGOMA
LOCATION DETAILED: POSTERIOR MID-PARIETAL SCALP
LOCATION DETAILED: LEFT SUPERIOR MEDIAL MALAR CHEEK
LOCATION DETAILED: LEFT SCAPHA
LOCATION DETAILED: RIGHT LATERAL FOREHEAD
LOCATION DETAILED: RIGHT SUPERIOR MEDIAL FOREHEAD
LOCATION DETAILED: RIGHT CENTRAL PARIETAL SCALP
LOCATION DETAILED: RIGHT SUPERIOR LATERAL MALAR CHEEK
LOCATION DETAILED: RIGHT FOREHEAD
LOCATION DETAILED: LEFT SUPERIOR PARIETAL SCALP

## 2020-08-17 ASSESSMENT — LOCATION ZONE DERM
LOCATION ZONE: EAR
LOCATION ZONE: FACE
LOCATION ZONE: NOSE
LOCATION ZONE: TRUNK
LOCATION ZONE: SCALP

## 2020-08-17 NOTE — PROCEDURE: LIQUID NITROGEN
Render Note In Bullet Format When Appropriate: No
Duration Of Freeze Thaw-Cycle (Seconds): 3
Post-Care Instructions: I reviewed with the patient in detail post-care instructions. Patient is to wear sunprotection, and avoid picking at any of the treated lesions. Pt may apply Vaseline to crusted or scabbing areas.
Consent: The patient's consent was obtained including but not limited to risks of crusting, scabbing, blistering, scarring, darker or lighter pigmentary change, recurrence, incomplete removal and infection.
Number Of Freeze-Thaw Cycles: 3 freeze-thaw cycles
Detail Level: Detailed

## 2020-08-17 NOTE — PROCEDURE: COUNSELING
Detail Level: Detailed
Patient Specific Counseling (Will Not Stick From Patient To Patient): Pt declined exam of the buttock today

## 2020-09-16 ENCOUNTER — APPOINTMENT (OUTPATIENT)
Dept: URBAN - METROPOLITAN AREA CLINIC 236 | Age: 80
Setting detail: DERMATOLOGY
End: 2020-09-16

## 2020-09-16 DIAGNOSIS — L57.0 ACTINIC KERATOSIS: ICD-10-CM

## 2020-09-16 PROCEDURE — OTHER LIQUID NITROGEN: OTHER

## 2020-09-16 PROCEDURE — OTHER COUNSELING: OTHER

## 2020-09-16 PROCEDURE — 17004 DESTROY PREMAL LESIONS 15/>: CPT

## 2020-09-16 ASSESSMENT — LOCATION SIMPLE DESCRIPTION DERM
LOCATION SIMPLE: NOSE
LOCATION SIMPLE: POSTERIOR SCALP
LOCATION SIMPLE: LEFT CHEEK
LOCATION SIMPLE: LEFT ZYGOMA
LOCATION SIMPLE: RIGHT FOREHEAD
LOCATION SIMPLE: LEFT FOREHEAD
LOCATION SIMPLE: RIGHT CHEEK
LOCATION SIMPLE: LEFT EAR
LOCATION SIMPLE: SCALP
LOCATION SIMPLE: RIGHT OCCIPITAL SCALP

## 2020-09-16 ASSESSMENT — LOCATION DETAILED DESCRIPTION DERM
LOCATION DETAILED: LEFT SCAPHA
LOCATION DETAILED: NASAL DORSUM
LOCATION DETAILED: LEFT CENTRAL ZYGOMA
LOCATION DETAILED: LEFT SUPERIOR PARIETAL SCALP
LOCATION DETAILED: RIGHT SUPERIOR LATERAL MALAR CHEEK
LOCATION DETAILED: RIGHT SUPERIOR FOREHEAD
LOCATION DETAILED: LEFT INFERIOR LATERAL MALAR CHEEK
LOCATION DETAILED: RIGHT SUPERIOR MEDIAL FOREHEAD
LOCATION DETAILED: RIGHT INFERIOR MEDIAL MALAR CHEEK
LOCATION DETAILED: RIGHT SUPERIOR OCCIPITAL SCALP
LOCATION DETAILED: LEFT INFERIOR CENTRAL MALAR CHEEK
LOCATION DETAILED: RIGHT LATERAL FOREHEAD
LOCATION DETAILED: LEFT FOREHEAD
LOCATION DETAILED: RIGHT CENTRAL PARIETAL SCALP
LOCATION DETAILED: LEFT SUPERIOR MEDIAL FOREHEAD
LOCATION DETAILED: RIGHT FOREHEAD
LOCATION DETAILED: LEFT INFERIOR MEDIAL FOREHEAD
LOCATION DETAILED: POSTERIOR MID-PARIETAL SCALP
LOCATION DETAILED: LEFT SUPERIOR MEDIAL MALAR CHEEK
LOCATION DETAILED: LEFT SUPERIOR FOREHEAD

## 2020-09-16 ASSESSMENT — LOCATION ZONE DERM
LOCATION ZONE: NOSE
LOCATION ZONE: EAR
LOCATION ZONE: SCALP
LOCATION ZONE: FACE

## 2020-09-16 NOTE — PROCEDURE: LIQUID NITROGEN
Detail Level: Detailed
Render Post-Care Instructions In Note?: no
Duration Of Freeze Thaw-Cycle (Seconds): 3
Post-Care Instructions: I reviewed with the patient in detail post-care instructions. Patient is to wear sunprotection, and avoid picking at any of the treated lesions. Pt may apply Vaseline to crusted or scabbing areas.
Consent: The patient's consent was obtained including but not limited to risks of crusting, scabbing, blistering, scarring, darker or lighter pigmentary change, recurrence, incomplete removal and infection.
Number Of Freeze-Thaw Cycles: 3 freeze-thaw cycles

## 2020-11-16 ENCOUNTER — APPOINTMENT (OUTPATIENT)
Dept: URBAN - METROPOLITAN AREA CLINIC 236 | Age: 80
Setting detail: DERMATOLOGY
End: 2020-11-17

## 2020-11-16 DIAGNOSIS — L24 IRRITANT CONTACT DERMATITIS: ICD-10-CM

## 2020-11-16 DIAGNOSIS — L57.0 ACTINIC KERATOSIS: ICD-10-CM

## 2020-11-16 PROBLEM — L30.9 DERMATITIS, UNSPECIFIED: Status: ACTIVE | Noted: 2020-11-16

## 2020-11-16 PROCEDURE — OTHER TREATMENT REGIMEN: OTHER

## 2020-11-16 PROCEDURE — OTHER COUNSELING: OTHER

## 2020-11-16 PROCEDURE — OTHER COUNSELING: TOPICAL STEROIDS: OTHER

## 2020-11-16 PROCEDURE — OTHER PRESCRIPTION: OTHER

## 2020-11-16 PROCEDURE — 99213 OFFICE O/P EST LOW 20 MIN: CPT | Mod: 25

## 2020-11-16 PROCEDURE — 17000 DESTRUCT PREMALG LESION: CPT

## 2020-11-16 PROCEDURE — 17003 DESTRUCT PREMALG LES 2-14: CPT

## 2020-11-16 PROCEDURE — OTHER LIQUID NITROGEN: OTHER

## 2020-11-16 RX ORDER — TRIAMCINOLONE ACETONIDE 1 MG/G
CREAM TOPICAL
Qty: 1 | Refills: 0 | Status: ERX | COMMUNITY
Start: 2020-11-16

## 2020-11-16 ASSESSMENT — LOCATION SIMPLE DESCRIPTION DERM
LOCATION SIMPLE: RIGHT CHEEK
LOCATION SIMPLE: LEFT CHEEK
LOCATION SIMPLE: LEFT SCALP
LOCATION SIMPLE: LEFT FOREARM
LOCATION SIMPLE: SCALP
LOCATION SIMPLE: FRONTAL SCALP
LOCATION SIMPLE: RIGHT OCCIPITAL SCALP

## 2020-11-16 ASSESSMENT — LOCATION DETAILED DESCRIPTION DERM
LOCATION DETAILED: RIGHT SUPERIOR CENTRAL MALAR CHEEK
LOCATION DETAILED: RIGHT SUPERIOR LATERAL BUCCAL CHEEK
LOCATION DETAILED: LEFT CENTRAL MALAR CHEEK
LOCATION DETAILED: RIGHT INFERIOR MEDIAL MALAR CHEEK
LOCATION DETAILED: LEFT INFERIOR LATERAL MALAR CHEEK
LOCATION DETAILED: RIGHT SUPERIOR OCCIPITAL SCALP
LOCATION DETAILED: RIGHT INFERIOR LATERAL MALAR CHEEK
LOCATION DETAILED: LEFT MEDIAL FRONTAL SCALP
LOCATION DETAILED: MEDIAL FRONTAL SCALP
LOCATION DETAILED: RIGHT LATERAL MALAR CHEEK
LOCATION DETAILED: LEFT VENTRAL PROXIMAL FOREARM
LOCATION DETAILED: RIGHT INFERIOR POSTAURICULAR SKIN

## 2020-11-16 ASSESSMENT — LOCATION ZONE DERM
LOCATION ZONE: FACE
LOCATION ZONE: ARM
LOCATION ZONE: SCALP

## 2020-11-16 NOTE — HPI: SKIN LESION
What Type Of Note Output Would You Prefer (Optional)?: Standard Output
How Severe Is Your Skin Lesion?: mild
Has Your Skin Lesion Been Treated?: not been treated
Is This A New Presentation, Or A Follow-Up?: Skin Lesion
Additional History: Not itchy anymore
Has Your Skin Lesion Been Treated?: been treated

## 2020-11-16 NOTE — PROCEDURE: LIQUID NITROGEN
Detail Level: Detailed
Consent: The patient's consent was obtained including but not limited to risks of crusting, scabbing, blistering, scarring, darker or lighter pigmentary change, recurrence, incomplete removal and infection.
Post-Care Instructions: I reviewed with the patient in detail post-care instructions. Patient is to wear sunprotection, and avoid picking at any of the treated lesions. Pt may apply Vaseline to crusted or scabbing areas.
Number Of Freeze-Thaw Cycles: 3 freeze-thaw cycles
Duration Of Freeze Thaw-Cycle (Seconds): 3
Render Note In Bullet Format When Appropriate: No

## 2020-11-16 NOTE — PROCEDURE: TREATMENT REGIMEN
Detail Level: Zone
Initiate Treatment: triamcinolone acetonide 0.1 % topical cream Apply thin layer to affected area on the left arm twice daily for a week, then once daily for a week, then discontinue.

## 2020-12-17 ENCOUNTER — APPOINTMENT (OUTPATIENT)
Dept: URBAN - METROPOLITAN AREA CLINIC 236 | Age: 80
Setting detail: DERMATOLOGY
End: 2020-12-18

## 2020-12-17 DIAGNOSIS — L57.0 ACTINIC KERATOSIS: ICD-10-CM

## 2020-12-17 PROCEDURE — OTHER LIQUID NITROGEN: OTHER

## 2020-12-17 PROCEDURE — OTHER COUNSELING: OTHER

## 2020-12-17 PROCEDURE — 17004 DESTROY PREMAL LESIONS 15/>: CPT

## 2020-12-17 ASSESSMENT — LOCATION ZONE DERM
LOCATION ZONE: SCALP
LOCATION ZONE: FACE

## 2020-12-17 ASSESSMENT — LOCATION DETAILED DESCRIPTION DERM
LOCATION DETAILED: LEFT CENTRAL MALAR CHEEK
LOCATION DETAILED: RIGHT SUPERIOR LATERAL BUCCAL CHEEK
LOCATION DETAILED: RIGHT SUPERIOR CENTRAL MALAR CHEEK
LOCATION DETAILED: LEFT INFERIOR LATERAL MALAR CHEEK
LOCATION DETAILED: LEFT MEDIAL FRONTAL SCALP
LOCATION DETAILED: RIGHT INFERIOR POSTAURICULAR SKIN
LOCATION DETAILED: RIGHT INFERIOR LATERAL MALAR CHEEK
LOCATION DETAILED: RIGHT INFERIOR FOREHEAD
LOCATION DETAILED: RIGHT LATERAL MALAR CHEEK
LOCATION DETAILED: MEDIAL FRONTAL SCALP
LOCATION DETAILED: RIGHT SUPERIOR OCCIPITAL SCALP
LOCATION DETAILED: RIGHT INFERIOR MEDIAL MALAR CHEEK
LOCATION DETAILED: RIGHT CENTRAL TEMPLE

## 2020-12-17 ASSESSMENT — LOCATION SIMPLE DESCRIPTION DERM
LOCATION SIMPLE: LEFT SCALP
LOCATION SIMPLE: LEFT CHEEK
LOCATION SIMPLE: SCALP
LOCATION SIMPLE: FRONTAL SCALP
LOCATION SIMPLE: RIGHT OCCIPITAL SCALP
LOCATION SIMPLE: RIGHT TEMPLE
LOCATION SIMPLE: RIGHT CHEEK
LOCATION SIMPLE: RIGHT FOREHEAD

## 2020-12-17 NOTE — PROCEDURE: LIQUID NITROGEN
Post-Care Instructions: I reviewed with the patient in detail post-care instructions. Patient is to wear sunprotection, and avoid picking at any of the treated lesions. Pt may apply Vaseline to crusted or scabbing areas.
Number Of Freeze-Thaw Cycles: 3 freeze-thaw cycles
Duration Of Freeze Thaw-Cycle (Seconds): 3
Render Note In Bullet Format When Appropriate: No
Detail Level: Detailed
Consent: The patient's consent was obtained including but not limited to risks of crusting, scabbing, blistering, scarring, darker or lighter pigmentary change, recurrence, incomplete removal and infection.

## 2021-02-25 ENCOUNTER — APPOINTMENT (OUTPATIENT)
Dept: URBAN - METROPOLITAN AREA CLINIC 236 | Age: 81
Setting detail: DERMATOLOGY
End: 2021-02-26

## 2021-02-25 DIAGNOSIS — L82.0 INFLAMED SEBORRHEIC KERATOSIS: ICD-10-CM

## 2021-02-25 DIAGNOSIS — L57.0 ACTINIC KERATOSIS: ICD-10-CM

## 2021-02-25 PROCEDURE — OTHER COUNSELING: OTHER

## 2021-02-25 PROCEDURE — 17110 DESTRUCT B9 LESION 1-14: CPT | Mod: 59

## 2021-02-25 PROCEDURE — OTHER LIQUID NITROGEN: OTHER

## 2021-02-25 PROCEDURE — 17004 DESTROY PREMAL LESIONS 15/>: CPT

## 2021-02-25 ASSESSMENT — LOCATION SIMPLE DESCRIPTION DERM
LOCATION SIMPLE: RIGHT FOREHEAD
LOCATION SIMPLE: RIGHT TEMPLE
LOCATION SIMPLE: RIGHT CHEEK
LOCATION SIMPLE: POSTERIOR SCALP
LOCATION SIMPLE: LEFT CHEEK
LOCATION SIMPLE: LEFT FOREHEAD
LOCATION SIMPLE: LEFT OCCIPITAL SCALP
LOCATION SIMPLE: RIGHT HAND

## 2021-02-25 ASSESSMENT — LOCATION DETAILED DESCRIPTION DERM
LOCATION DETAILED: LEFT INFERIOR LATERAL FOREHEAD
LOCATION DETAILED: LEFT MEDIAL FOREHEAD
LOCATION DETAILED: RIGHT ULNAR DORSAL HAND
LOCATION DETAILED: RIGHT FOREHEAD
LOCATION DETAILED: MID-OCCIPITAL SCALP
LOCATION DETAILED: RIGHT SUPERIOR CENTRAL BUCCAL CHEEK
LOCATION DETAILED: 2ND WEB SPACE RIGHT HAND
LOCATION DETAILED: RIGHT RADIAL DORSAL HAND
LOCATION DETAILED: RIGHT CENTRAL TEMPLE
LOCATION DETAILED: RIGHT INFERIOR CENTRAL MALAR CHEEK
LOCATION DETAILED: RIGHT LATERAL BUCCAL CHEEK
LOCATION DETAILED: LEFT MEDIAL MALAR CHEEK
LOCATION DETAILED: RIGHT INFERIOR MEDIAL FOREHEAD
LOCATION DETAILED: LEFT CENTRAL MALAR CHEEK
LOCATION DETAILED: LEFT INFERIOR FOREHEAD
LOCATION DETAILED: LEFT INFERIOR MEDIAL MALAR CHEEK
LOCATION DETAILED: LEFT SUPERIOR OCCIPITAL SCALP
LOCATION DETAILED: 3RD WEB SPACE RIGHT HAND

## 2021-02-25 ASSESSMENT — LOCATION ZONE DERM
LOCATION ZONE: SCALP
LOCATION ZONE: HAND
LOCATION ZONE: FACE

## 2021-02-25 NOTE — PROCEDURE: LIQUID NITROGEN
Detail Level: Detailed
Duration Of Freeze Thaw-Cycle (Seconds): 3
Consent: The patient's consent was obtained including but not limited to risks of crusting, scabbing, blistering, scarring, darker or lighter pigmentary change, recurrence, incomplete removal and infection.
Render Post-Care Instructions In Note?: no
Number Of Freeze-Thaw Cycles: 3 freeze-thaw cycles
Post-Care Instructions: I reviewed with the patient in detail post-care instructions. Patient is to wear sunprotection, and avoid picking at any of the treated lesions. Pt may apply Vaseline to crusted or scabbing areas.
Medical Necessity Information: It is in your best interest to select a reason for this procedure from the list below. All of these items fulfill various CMS LCD requirements except the new and changing color options.
Medical Necessity Clause: This procedure was medically necessary because the lesions that were treated were: itchy per patient report.
Include Z78.9 (Other Specified Conditions Influencing Health Status) As An Associated Diagnosis?: Yes

## 2021-06-07 ENCOUNTER — APPOINTMENT (OUTPATIENT)
Dept: URBAN - METROPOLITAN AREA CLINIC 236 | Age: 81
Setting detail: DERMATOLOGY
End: 2021-06-08

## 2021-06-07 DIAGNOSIS — L57.0 ACTINIC KERATOSIS: ICD-10-CM

## 2021-06-07 DIAGNOSIS — L82.0 INFLAMED SEBORRHEIC KERATOSIS: ICD-10-CM

## 2021-06-07 PROCEDURE — OTHER COUNSELING: OTHER

## 2021-06-07 PROCEDURE — 17000 DESTRUCT PREMALG LESION: CPT | Mod: 59

## 2021-06-07 PROCEDURE — 17110 DESTRUCT B9 LESION 1-14: CPT

## 2021-06-07 PROCEDURE — 17003 DESTRUCT PREMALG LES 2-14: CPT | Mod: 59

## 2021-06-07 PROCEDURE — OTHER LIQUID NITROGEN: OTHER

## 2021-06-07 ASSESSMENT — LOCATION SIMPLE DESCRIPTION DERM
LOCATION SIMPLE: RIGHT CHEEK
LOCATION SIMPLE: RIGHT FOREHEAD
LOCATION SIMPLE: LEFT CHEEK
LOCATION SIMPLE: SCALP
LOCATION SIMPLE: RIGHT HAND
LOCATION SIMPLE: LEFT OCCIPITAL SCALP
LOCATION SIMPLE: POSTERIOR SCALP
LOCATION SIMPLE: LEFT FOREHEAD
LOCATION SIMPLE: RIGHT TEMPLE

## 2021-06-07 ASSESSMENT — LOCATION DETAILED DESCRIPTION DERM
LOCATION DETAILED: LEFT INFERIOR MEDIAL MALAR CHEEK
LOCATION DETAILED: LEFT CENTRAL MALAR CHEEK
LOCATION DETAILED: RIGHT ULNAR DORSAL HAND
LOCATION DETAILED: RIGHT RADIAL DORSAL HAND
LOCATION DETAILED: RIGHT INFERIOR MEDIAL FOREHEAD
LOCATION DETAILED: RIGHT INFERIOR CENTRAL MALAR CHEEK
LOCATION DETAILED: 2ND WEB SPACE RIGHT HAND
LOCATION DETAILED: RIGHT MEDIAL FOREHEAD
LOCATION DETAILED: 3RD WEB SPACE RIGHT HAND
LOCATION DETAILED: RIGHT CENTRAL TEMPLE
LOCATION DETAILED: LEFT INFERIOR LATERAL FOREHEAD
LOCATION DETAILED: RIGHT FOREHEAD
LOCATION DETAILED: LEFT MEDIAL MALAR CHEEK
LOCATION DETAILED: MID-OCCIPITAL SCALP
LOCATION DETAILED: LEFT INFERIOR CENTRAL MALAR CHEEK
LOCATION DETAILED: LEFT SUPERIOR OCCIPITAL SCALP
LOCATION DETAILED: LEFT INFERIOR FOREHEAD
LOCATION DETAILED: RIGHT SUPERIOR CENTRAL BUCCAL CHEEK
LOCATION DETAILED: LEFT MEDIAL FOREHEAD
LOCATION DETAILED: LEFT SUPERIOR FOREHEAD
LOCATION DETAILED: RIGHT LATERAL BUCCAL CHEEK
LOCATION DETAILED: LEFT CENTRAL POSTAURICULAR SKIN

## 2021-06-07 ASSESSMENT — LOCATION ZONE DERM
LOCATION ZONE: FACE
LOCATION ZONE: SCALP
LOCATION ZONE: HAND

## 2021-06-07 NOTE — PROCEDURE: LIQUID NITROGEN
Medical Necessity Clause: This procedure was medically necessary because the lesions that were treated were: itchy per patient report.
Detail Level: Detailed
Consent: The patient's consent was obtained including but not limited to risks of crusting, scabbing, blistering, scarring, darker or lighter pigmentary change, recurrence, incomplete removal and infection.
Render Post-Care Instructions In Note?: no
Medical Necessity Information: It is in your best interest to select a reason for this procedure from the list below. All of these items fulfill various CMS LCD requirements except the new and changing color options.
Post-Care Instructions: I reviewed with the patient in detail post-care instructions. Patient is to wear sunprotection, and avoid picking at any of the treated lesions. Pt may apply Vaseline to crusted or scabbing areas.
Duration Of Freeze Thaw-Cycle (Seconds): 3
Include Z78.9 (Other Specified Conditions Influencing Health Status) As An Associated Diagnosis?: Yes
Number Of Freeze-Thaw Cycles: 3 freeze-thaw cycles

## 2021-10-04 ENCOUNTER — APPOINTMENT (OUTPATIENT)
Dept: URBAN - METROPOLITAN AREA CLINIC 236 | Age: 81
Setting detail: DERMATOLOGY
End: 2021-10-04

## 2021-10-04 DIAGNOSIS — B02.9 ZOSTER WITHOUT COMPLICATIONS: ICD-10-CM

## 2021-10-04 PROCEDURE — 99213 OFFICE O/P EST LOW 20 MIN: CPT

## 2021-10-04 PROCEDURE — OTHER MIPS QUALITY: OTHER

## 2021-10-04 PROCEDURE — OTHER PRESCRIPTION SAMPLES PROVIDED: OTHER

## 2021-10-04 PROCEDURE — OTHER COUNSELING: OTHER

## 2021-10-04 ASSESSMENT — LOCATION SIMPLE DESCRIPTION DERM
LOCATION SIMPLE: RIGHT FOREHEAD
LOCATION SIMPLE: RIGHT CHEEK

## 2021-10-04 ASSESSMENT — LOCATION ZONE DERM: LOCATION ZONE: FACE

## 2021-10-04 ASSESSMENT — LOCATION DETAILED DESCRIPTION DERM
LOCATION DETAILED: RIGHT LATERAL FOREHEAD
LOCATION DETAILED: RIGHT LATERAL MALAR CHEEK

## 2021-10-04 NOTE — PROCEDURE: MIPS QUALITY
Quality 226: Preventive Care And Screening: Tobacco Use: Screening And Cessation Intervention: Patient screened for tobacco use and is an ex/non-smoker
Quality 110: Preventive Care And Screening: Influenza Immunization: Influenza Immunization previously received during influenza season
Quality 111:Pneumonia Vaccination Status For Older Adults: Pneumococcal Vaccination Previously Received
Quality 431: Preventive Care And Screening: Unhealthy Alcohol Use - Screening: Patient identified as an unhealthy alcohol user when screened for unhealthy alcohol use using a systematic screening method and received brief counseling
Detail Level: Detailed

## 2021-10-04 NOTE — PROCEDURE: PRESCRIPTION SAMPLES PROVIDED
Detail Level: Zone
Lot/Batch Number (Optional): [id number]
Expiration Date (Optional): 11/2022
Samples Given: CeraVe Anti-Itch

## 2021-11-02 ENCOUNTER — APPOINTMENT (OUTPATIENT)
Dept: URBAN - METROPOLITAN AREA CLINIC 236 | Age: 81
Setting detail: DERMATOLOGY
End: 2021-11-03

## 2021-11-02 DIAGNOSIS — L21.8 OTHER SEBORRHEIC DERMATITIS: ICD-10-CM

## 2021-11-02 DIAGNOSIS — L57.0 ACTINIC KERATOSIS: ICD-10-CM

## 2021-11-02 PROCEDURE — 17004 DESTROY PREMAL LESIONS 15/>: CPT

## 2021-11-02 PROCEDURE — OTHER PRESCRIPTION: OTHER

## 2021-11-02 PROCEDURE — OTHER LIQUID NITROGEN: OTHER

## 2021-11-02 PROCEDURE — OTHER TREATMENT REGIMEN: OTHER

## 2021-11-02 PROCEDURE — 99213 OFFICE O/P EST LOW 20 MIN: CPT | Mod: 25

## 2021-11-02 PROCEDURE — OTHER COUNSELING: OTHER

## 2021-11-02 RX ORDER — KETOCONAZOLE 20 MG/ML
SHAMPOO, SUSPENSION TOPICAL TIW
Qty: 120 | Refills: 3 | Status: ERX | COMMUNITY
Start: 2021-11-02

## 2021-11-02 ASSESSMENT — LOCATION DETAILED DESCRIPTION DERM
LOCATION DETAILED: LEFT INFERIOR FOREHEAD
LOCATION DETAILED: RIGHT SUPERIOR CENTRAL MALAR CHEEK
LOCATION DETAILED: LEFT INFERIOR CENTRAL MALAR CHEEK
LOCATION DETAILED: RIGHT CENTRAL ZYGOMA
LOCATION DETAILED: RIGHT CENTRAL TEMPLE
LOCATION DETAILED: RIGHT RADIAL DORSAL HAND
LOCATION DETAILED: POSTERIOR MID-PARIETAL SCALP
LOCATION DETAILED: RIGHT ULNAR DORSAL HAND
LOCATION DETAILED: RIGHT SUPERIOR FOREHEAD
LOCATION DETAILED: RIGHT FOREHEAD
LOCATION DETAILED: LEFT FOREHEAD
LOCATION DETAILED: LEFT MEDIAL ZYGOMA
LOCATION DETAILED: LEFT SUPERIOR LATERAL MALAR CHEEK
LOCATION DETAILED: LEFT SUPERIOR MEDIAL FOREHEAD

## 2021-11-02 ASSESSMENT — LOCATION SIMPLE DESCRIPTION DERM
LOCATION SIMPLE: LEFT ZYGOMA
LOCATION SIMPLE: RIGHT HAND
LOCATION SIMPLE: LEFT CHEEK
LOCATION SIMPLE: RIGHT ZYGOMA
LOCATION SIMPLE: LEFT FOREHEAD
LOCATION SIMPLE: RIGHT CHEEK
LOCATION SIMPLE: RIGHT FOREHEAD
LOCATION SIMPLE: RIGHT TEMPLE
LOCATION SIMPLE: POSTERIOR SCALP

## 2021-11-02 ASSESSMENT — LOCATION ZONE DERM
LOCATION ZONE: SCALP
LOCATION ZONE: HAND
LOCATION ZONE: FACE

## 2021-11-02 NOTE — PROCEDURE: LIQUID NITROGEN
Consent: The patient's consent was obtained including but not limited to risks of crusting, scabbing, blistering, scarring, darker or lighter pigmentary change, recurrence, incomplete removal and infection.
Post-Care Instructions: I reviewed with the patient in detail post-care instructions. Patient is to wear sunprotection, and avoid picking at any of the treated lesions. Pt may apply Vaseline to crusted or scabbing areas.
Show Aperture Variable?: Yes
Render Post-Care Instructions In Note?: no
Detail Level: Detailed
Duration Of Freeze Thaw-Cycle (Seconds): 3
Number Of Freeze-Thaw Cycles: 3 freeze-thaw cycles

## 2021-11-02 NOTE — PROCEDURE: TREATMENT REGIMEN
Detail Level: Zone
Initiate Treatment: ketoconazole 2 % shampoo TIW\\nApply to damp scalp, leave on 5-10 minutes and rinse. Three times per week as needed for flares.

## 2023-08-17 ENCOUNTER — APPOINTMENT (OUTPATIENT)
Dept: URBAN - METROPOLITAN AREA CLINIC 236 | Age: 83
Setting detail: DERMATOLOGY
End: 2023-08-21

## 2023-08-17 DIAGNOSIS — D49.2 NEOPLASM OF UNSPECIFIED BEHAVIOR OF BONE, SOFT TISSUE, AND SKIN: ICD-10-CM

## 2023-08-17 PROCEDURE — 11103 TANGNTL BX SKIN EA SEP/ADDL: CPT

## 2023-08-17 PROCEDURE — 11102 TANGNTL BX SKIN SINGLE LES: CPT

## 2023-08-17 PROCEDURE — OTHER BIOPSY BY SHAVE METHOD: OTHER

## 2023-08-17 ASSESSMENT — LOCATION SIMPLE DESCRIPTION DERM
LOCATION SIMPLE: LEFT CHEEK
LOCATION SIMPLE: LEFT SCALP
LOCATION SIMPLE: RIGHT HAND

## 2023-08-17 ASSESSMENT — LOCATION ZONE DERM
LOCATION ZONE: SCALP
LOCATION ZONE: HAND
LOCATION ZONE: FACE

## 2023-08-17 ASSESSMENT — LOCATION DETAILED DESCRIPTION DERM
LOCATION DETAILED: LEFT MEDIAL FRONTAL SCALP
LOCATION DETAILED: LEFT SUPERIOR LATERAL MALAR CHEEK
LOCATION DETAILED: RIGHT DORSAL RING METACARPOPHALANGEAL JOINT

## 2023-09-07 ENCOUNTER — APPOINTMENT (OUTPATIENT)
Dept: URBAN - METROPOLITAN AREA CLINIC 236 | Age: 83
Setting detail: DERMATOLOGY
End: 2023-09-07

## 2023-09-07 PROBLEM — C44.329 SQUAMOUS CELL CARCINOMA OF SKIN OF OTHER PARTS OF FACE: Status: ACTIVE | Noted: 2023-09-07

## 2023-09-07 PROBLEM — C44.622 SQUAMOUS CELL CARCINOMA OF SKIN OF RIGHT UPPER LIMB, INCLUDING SHOULDER: Status: ACTIVE | Noted: 2023-09-07

## 2023-09-07 PROCEDURE — 13133 CMPLX RPR F/C/C/M/N/AX/G/H/F: CPT

## 2023-09-07 PROCEDURE — 17311 MOHS 1 STAGE H/N/HF/G: CPT | Mod: 76

## 2023-09-07 PROCEDURE — OTHER MOHS SURGERY: OTHER

## 2023-09-07 PROCEDURE — 17311 MOHS 1 STAGE H/N/HF/G: CPT

## 2023-09-07 PROCEDURE — 17312 MOHS ADDL STAGE: CPT

## 2023-09-07 PROCEDURE — 13132 CMPLX RPR F/C/C/M/N/AX/G/H/F: CPT

## 2023-09-07 NOTE — PROCEDURE: MOHS SURGERY
How Severe Is This Condition?: mild Additional History: Pt complains of multiple lesions which she believes they are boils. She’s been waxing at the same location with no skin issues but has noticed rashes/boils lately. Depth Of Tumor Invasion (For Histology): adipose tissue

## 2023-09-07 NOTE — PROCEDURE: MOHS SURGERY
Speaking Coherently O-T Plasty Text: The defect edges were debeveled with a #15 scalpel blade. Given the location of the defect, shape of the defect and the proximity to free margins an O-T plasty was deemed most appropriate. Using a sterile surgical marker, an appropriate O-T plasty was drawn incorporating the defect and placing the expected incisions within the relaxed skin tension lines where possible. The area thus outlined was incised deep to adipose tissue with a #15 scalpel blade. The skin margins were undermined to an appropriate distance in all directions utilizing iris scissors. Following this, the designed flap was carried over into the primary defect and sutured into place.

## 2023-09-07 NOTE — PROCEDURE: MOHS SURGERY
65.8 No Residual Tumor Seen Histology Text: There were no malignant cells seen in the sections examined.

## 2023-09-07 NOTE — PROCEDURE: MOHS SURGERY
PMD Post-Care Instructions: I reviewed with the patient in detail post-care instructions. Patient is not to engage in any heavy lifting, exercise, or swimming for the next 14 days. Should the patient develop any fevers, chills, bleeding, severe pain patient will contact the office immediately.

## 2023-09-21 ENCOUNTER — APPOINTMENT (OUTPATIENT)
Dept: URBAN - METROPOLITAN AREA CLINIC 236 | Age: 83
Setting detail: DERMATOLOGY
End: 2023-09-21

## 2023-09-21 DIAGNOSIS — Z48.02 ENCOUNTER FOR REMOVAL OF SUTURES: ICD-10-CM

## 2023-09-21 PROCEDURE — 99024 POSTOP FOLLOW-UP VISIT: CPT

## 2023-09-21 PROCEDURE — OTHER SUTURE REMOVAL (GLOBAL PERIOD): OTHER

## 2023-09-21 ASSESSMENT — LOCATION SIMPLE DESCRIPTION DERM: LOCATION SIMPLE: RIGHT HAND

## 2023-09-21 ASSESSMENT — PAIN INTENSITY VAS: HOW INTENSE IS YOUR PAIN 0 BEING NO PAIN, 10 BEING THE MOST SEVERE PAIN POSSIBLE?: NO PAIN

## 2023-09-21 ASSESSMENT — LOCATION ZONE DERM: LOCATION ZONE: HAND

## 2023-09-21 ASSESSMENT — LOCATION DETAILED DESCRIPTION DERM: LOCATION DETAILED: RIGHT DORSAL SMALL METACARPOPHALANGEAL JOINT

## 2023-09-21 NOTE — PROCEDURE: SUTURE REMOVAL (GLOBAL PERIOD)
Detail Level: Detailed
Add 72990 Cpt? (Important Note: In 2017 The Use Of 53108 Is Being Tracked By Cms To Determine Future Global Period Reimbursement For Global Periods): yes

## 2025-05-06 NOTE — PROCEDURE: MOHS SURGERY
To wean off of lexapro.  Go to 1/2 tablet for 2 weeks then 1/2 tablet every other day for 1 week, then STOP medication.    Epidermal Closure: simple interrupted